# Patient Record
Sex: MALE | Race: WHITE | Employment: OTHER | RURAL
[De-identification: names, ages, dates, MRNs, and addresses within clinical notes are randomized per-mention and may not be internally consistent; named-entity substitution may affect disease eponyms.]

---

## 2017-04-21 ENCOUNTER — OFFICE VISIT (OUTPATIENT)
Dept: CARDIOLOGY CLINIC | Age: 82
End: 2017-04-21

## 2017-04-21 VITALS
OXYGEN SATURATION: 98 % | WEIGHT: 221 LBS | SYSTOLIC BLOOD PRESSURE: 120 MMHG | HEART RATE: 80 BPM | BODY MASS INDEX: 27.48 KG/M2 | HEIGHT: 75 IN | DIASTOLIC BLOOD PRESSURE: 80 MMHG | RESPIRATION RATE: 18 BRPM

## 2017-04-21 DIAGNOSIS — Z95.0 CARDIAC PACEMAKER IN SITU: ICD-10-CM

## 2017-04-21 DIAGNOSIS — J98.4 AMIODARONE PULMONARY TOXICITY: ICD-10-CM

## 2017-04-21 DIAGNOSIS — T46.2X5A AMIODARONE PULMONARY TOXICITY: ICD-10-CM

## 2017-04-21 DIAGNOSIS — I50.32 CHRONIC DIASTOLIC CONGESTIVE HEART FAILURE (HCC): ICD-10-CM

## 2017-04-21 DIAGNOSIS — I11.9 HYPERTENSIVE HEART DISEASE WITHOUT HEART FAILURE: ICD-10-CM

## 2017-04-21 DIAGNOSIS — I35.0 AORTIC VALVE STENOSIS, UNSPECIFIED ETIOLOGY: ICD-10-CM

## 2017-04-21 DIAGNOSIS — I48.20 CHRONIC ATRIAL FIBRILLATION (HCC): ICD-10-CM

## 2017-04-21 DIAGNOSIS — J84.9 ILD (INTERSTITIAL LUNG DISEASE) (HCC): ICD-10-CM

## 2017-04-21 DIAGNOSIS — I49.5 SSS (SICK SINUS SYNDROME) (HCC): Primary | ICD-10-CM

## 2017-04-21 NOTE — PROGRESS NOTES
Verified patient with two patient identifiers. Medications reviewed/approved by Dr. Rachel Vargas. Verbal from Dr. Rachel Vargas to remove the medications that were deleted during the visit.

## 2017-04-21 NOTE — MR AVS SNAPSHOT
Visit Information Date & Time Provider Department Dept. Phone Encounter #  
 4/21/2017  9:20 AM Valentina Hidalgo, [address] [facility] [phone] Your Appointments 8/24/2017 10:30 AM  
PACEMAKER with PACEMAKER, [facility]) Appt Note: Janine pt - new to Dr. Beverly Cueto (needs to see Dr. Shweta Solomon) MDT SC PM, ?carelink [address] [place] [address]  
[phone] [address] [place] [address]  
  
    
 8/24/2017 10:45 AM  
ESTABLISHED PATIENT with Krystle Fine MD  
[facility]) Appt Note: Janine pt - new to Dr. Beverly Cueto (needs to see Dr. Shweta Solomon) MDT PM device, ?carelink; Janine pt - new to Dr. Beverly Cueto (needs to see Dr. Shweta Solomon) MDT SC PM, ?carelink [address]  
[phone] [address] [place] [address]  
  
    
 4/20/2018 10:00 AM  
ESTABLISHED PATIENT with Valentina Hidalgo MD  
[address] [place] [facility]) Appt Note: annual f/u $0cp [address] [address] [phone]  
  
   
 [address] [place] [address] Upcoming Health Maintenance Date Due DTaP/Tdap/Td series (1 - Tdap) 2/26/1955 ZOSTER VACCINE AGE 60> 2/26/1994 GLAUCOMA SCREENING Q2Y 2/26/1999 Pneumococcal 65+ Low/Medium Risk (1 of 2 - PCV13) 2/26/1999 MEDICARE YEARLY EXAM 2/26/1999 INFLUENZA AGE 9 TO ADULT 8/1/2016 Allergies as of 4/21/2017  Review Complete On: 4/21/2017 By: Valentina Hidalgo MD  
  
 Severity Noted Reaction Type Reactions Amiodarone High 08/26/2016   Side Effect Shortness of Breath Pulmonary fibrosis Current Immunizations  Never Reviewed No immunizations on file. Not reviewed this visit You Were Diagnosed With   
  
 Codes Comments SSS (sick sinus syndrome) (HCC)    -  Primary ICD-10-CM: I49.5 ICD-9-CM: 427.81 Chronic atrial fibrillation (HCC)     ICD-10-CM: G84.9 ICD-9-CM: 427.31 Hypertensive heart disease without heart failure     ICD-10-CM: I11.9 ICD-9-CM: 402.90 Chronic diastolic congestive heart failure (HCC)     ICD-10-CM: I50.32 
ICD-9-CM: 428.32, 428.0 Aortic valve stenosis, unspecified etiology     ICD-10-CM: I35.0 ICD-9-CM: 424.1 Cardiac pacemaker in situ     ICD-10-CM: Z95.0 ICD-9-CM: V45.01   
 ILD [facility]interstitial lung disease) ([facility]     ICD-10-CM: J84.9 ICD-9-CM: 170 Amiodarone pulmonary toxicity (HCC)     ICD-10-CM: J98.4, T46.2X5A 
ICD-9-CM: 508.8, E980.4 Vitals BP Pulse Resp Height(growth percentile) Weight(growth percentile) SpO2  
 120/80 (BP 1 Location: Left arm, BP Patient Position: Sitting) 80 18 6' 3\" (1.905 m) 221 lb (100.2 kg) 98% BMI Smoking Status 27.62 kg/m2 Former Smoker Vitals History BMI and BSA Data Body Mass Index Body Surface Area  
 27.62 kg/m 2 2.3 m 2 Preferred Pharmacy Pharmacy Name Phone [address] [phone] Your Updated Medication List  
  
   
This list is accurate as of: 4/21/17  9:47 AM.  Always use your most recent med list.  
  
  
  
  
 apixaban 5 mg tablet Commonly known as:  Royer Mooney Take 5 mg by mouth two (2) times a day. finasteride 5 mg tablet Commonly known as:  PROSCAR  
TAKE 1 TABLET EVERY DAY  
  
 FUROSEMIDE PO Take 5 mg by mouth daily. levothyroxine 50 mcg tablet Commonly known as:  SYNTHROID  
  
 lisinopril 20 mg tablet Commonly known as:  PRINIVIL, ZESTRIL  
TAKE 1 TABLET TWICE DAILY (HAVE 3 MONTHS TO BE SEEN OR NO REFILLS)  
  
 loratadine 10 mg tablet Commonly known as:  Louetta Lin Take 1 Tab by mouth daily. metoprolol tartrate 50 mg tablet Commonly known as:  LOPRESSOR  
 TAKE 1 TABLET BY MOUTH EVERY 12 HOURS * predniSONE 5 mg tablet Commonly known as:  Milus Mile Take 1 Tab by mouth daily. Indications: pulmonary fibrosis * predniSONE 1 mg tablet Commonly known as:  Milus Mile Take 1 Tab by mouth daily (with breakfast). Indications: pulmonary fibrosis  
  
 traZODone 50 mg tablet Commonly known as:  DESYREL  
as needed. VITAMIN D3 2,000 unit Cap capsule Generic drug:  Cholecalciferol (Vitamin D3) Take  by mouth daily. * Notice: This list has 2 medication(s) that are the same as other medications prescribed for you. Read the directions carefully, and ask your doctor or other care provider to review them with you. We Performed the Following AMB POC EKG ROUTINE W/ 12 LEADS, INTER & REP [71937 CPT(R)] Introducing [facility] SERVICES! [place] [employer] introduces [employer] patient portal. Now you can access parts of your medical record, email your doctor's office, and request medication refills online. 1. In your internet browser, go to [email or web address] 2. Click on the First Time User? Click Here link in the Sign In box. You will see the New Member Sign Up page. 3. Enter your Duriana Access Code exactly as it appears below. You will not need to use this code after youve completed the sign-up process. If you do not sign up before the expiration date, you must request a new code. · Duriana Access Code: [email or web address] Expires: 7/20/2017  9:00 AM 
 
4. Enter the last four digits of your Social Security Number (xxxx) and Date of Birth (mm/dd/yyyy) as indicated and click Submit. You will be taken to the next sign-up page. 5. Create a Duriana ID. This will be your Duriana login ID and cannot be changed, so think of one that is secure and easy to remember. 6. Create a Duriana password. You can change your password at any time. 7. Enter your Password Reset Question and Answer.  This can be used at a later time if you forget your password. 8. Enter your e-mail address. You will receive e-mail notification when new information is available in [address] 9. Click Sign Up. You can now view and download portions of your medical record. 10. Click the Download Summary menu link to download a portable copy of your medical information. If you have questions, please visit the Frequently Asked Questions section of the E-Box - [email or web address] website. Remember, E-Box - [email or web address] is NOT to be used for urgent needs. For medical emergencies, dial 911. Now available from your iPhone and Android! Please provide this summary of care documentation to your next provider. Your primary care clinician is listed as Nehal Flowers. If you have any questions after today's visit, please call [phone].

## 2017-04-21 NOTE — PROGRESS NOTES
Rayray Melo is a 80 y.o. male is here for routine f/u. Here for routine f/u and PPM check. Stable SMITH which he attributes to amiodarone pulmonary toxicity/interstitial fibrosis, some fatigue. The patient denies chest pain/ shortness of breath, orthopnea, PND, LE edema, palpitations, syncope, presyncope or fatigue. 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    Cushingoid side effect of steroids ([address] 05/02/2014    Aortic valve stenosis     ILD (interstitial lung disease) ([address]     Orthostatic hypotension 04/24/2013    SSS (sick sinus syndrome) ([address]     HCVD (hypertensive cardiovascular disease)     Diastolic CHF ([address]     CRI (chronic renal insufficiency)       Urbano Frances NP  Past Medical History:   Diagnosis Date    Aortic valve stenosis     Atrial fibrillation (HCC)     BPH (benign prostatic hyperplasia)     CRI (chronic renal insufficiency)     Diastolic CHF ([address]     ED (erectile dysfunction)     HCVD (hypertensive cardiovascular disease)     ILD (interstitial lung disease) ([address]     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  Orthostatic hypotension 4/24/2013    SSS (sick sinus syndrome) (HCC)       Past Surgical History:   Procedure Laterality Date    HX PACEMAKER  2001    DDD, [employer]     Allergies   Allergen Reactions    Amiodarone Shortness of Breath     Pulmonary fibrosis      Family History   Problem Relation Age of Onset    Alcohol abuse Mother     Alcohol abuse Father       Social History     Social History    Marital status:      Spouse name: N/A    Number of children: N/A    Years of education: N/A     Occupational History    Not on file.      Social History Main Topics    Smoking status: Former Smoker     Types: Pipe     Quit date: 4/24/1983    Smokeless tobacco: Not on file    Alcohol use Yes    Drug use: Not on file    Sexual activity: Not on file     Other Topics Concern    Not on file     Social History Narrative      Current Outpatient Prescriptions   Medication Sig    predniSONE (DELTASONE) 5 mg tablet Take 1 Tab by mouth daily. Indications: pulmonary fibrosis    predniSONE (DELTASONE) 1 mg tablet Take 1 Tab by mouth daily (with breakfast). Indications: pulmonary fibrosis    metoprolol tartrate (LOPRESSOR) 50 mg tablet TAKE 1 TABLET BY MOUTH EVERY 12 HOURS    lisinopril (PRINIVIL, ZESTRIL) 20 mg tablet TAKE 1 TABLET TWICE DAILY (HAVE 3 MONTHS TO BE SEEN OR NO REFILLS)    finasteride (PROSCAR) 5 mg tablet TAKE 1 TABLET EVERY DAY    loratadine (CLARITIN) 10 mg tablet Take 1 Tab by mouth daily.  levothyroxine (SYNTHROID) 50 mcg tablet     traZODone (DESYREL) 50 mg tablet as needed.  Cholecalciferol, Vitamin D3, (VITAMIN D3) 2,000 unit cap capsule Take  by mouth daily.  FUROSEMIDE PO Take 5 mg by mouth daily.  apixaban (ELIQUIS) 5 mg tablet Take 5 mg by mouth two (2) times a day. No current facility-administered medications for this visit. Review of Symptoms:    CONST  No weight change. No fever, chills, sweats    ENT No visual changes, URI sx, sore throat    CV  See HPI   RESP  No cough, or sputum, wheezing. Also see HPI   GI  No abdominal pain or change in bowel habits. No heartburn or dysphagia. No melena or rectal bleeding.   No dysuria, urgency, frequency, hematuria   MSKEL  No joint pain, swelling. No muscle pain. SKIN  No rash or lesions. NEURO  No headache, syncope, or seizure. No weakness, loss of sensation, or paresthesias. PSYCH  No low mood or depression  No anxiety. HE/LYMPH  No easy bruising, abnormal bleeding, or enlarged glands.         Physical ExamPhysical Exam:    Visit Vitals    /80 (BP 1 Location: Left arm, BP Patient Position: Sitting)    Pulse 80    Resp 18    Ht 6' 3\" (1.905 m)    Wt 221 lb (100.2 kg)    SpO2 98%    BMI 27.62 kg/m2     Gen: NAD  HEENT: PERRL, throat clear  Neck: no mass or thyromegaly, no JVD   Heart:  Regular,Nl S1S2,  no murmur, gallop or rub.   Lungs:  clear  Abdomen:   Soft, non-tender, bowel sounds are active.   Extremities:  No edema  Pulse: symmetric  Neuro: A&O times 3, WNL      Cardiographics    ECG: V paced      Labs:   Lab Results   Component Value Date/Time    Sodium 145 08/10/2015 12:56 PM    Sodium 144 06/04/2015 02:18 PM    Sodium 144 05/02/2014 11:40 AM    Potassium 4.7 08/10/2015 12:56 PM    Potassium 4.5 06/04/2015 02:18 PM    Potassium 4.7 05/02/2014 11:40 AM    Chloride 101 08/10/2015 12:56 PM    Chloride 102 06/04/2015 02:18 PM    Chloride 105 05/02/2014 11:40 AM    CO2 28 08/10/2015 12:56 PM    CO2 29 06/04/2015 02:18 PM    CO2 29 05/02/2014 11:40 AM    Glucose 94 08/10/2015 12:56 PM    Glucose 106 06/04/2015 02:18 PM    Glucose 98 05/02/2014 11:40 AM    BUN 25 08/10/2015 12:56 PM    BUN 21 06/04/2015 02:18 PM    BUN 24 05/02/2014 11:40 AM    Creatinine 1.26 08/10/2015 12:56 PM    Creatinine 1.24 06/04/2015 02:18 PM    Creatinine 1.36 05/02/2014 11:40 AM    BUN/Creatinine ratio 20 08/10/2015 12:56 PM    BUN/Creatinine ratio 17 06/04/2015 02:18 PM    BUN/Creatinine ratio 18 05/02/2014 11:40 AM    GFR est AA 61 08/10/2015 12:56 PM    GFR est AA 63 06/04/2015 02:18 PM    GFR est AA 56 05/02/2014 11:40 AM    GFR est non-AA 53 08/10/2015 12:56 PM    GFR est non-AA 54 06/04/2015 02:18 PM    GFR est non-AA 49 05/02/2014 11:40 AM    Calcium 9.2 08/10/2015 12:56 PM    Calcium 9.3 06/04/2015 02:18 PM    Calcium 8.7 05/02/2014 11:40 AM    Bilirubin, total 0.6 06/04/2015 02:18 PM    Bilirubin, total 0.4 05/02/2014 11:40 AM    AST (SGOT) 19 06/04/2015 02:18 PM    AST (SGOT) 13 05/02/2014 11:40 AM    Alk. phosphatase 54 06/04/2015 02:18 PM    Alk.  phosphatase 50 05/02/2014 11:40 AM    Protein, total 6.3 06/04/2015 02:18 PM    Protein, total 6.4 05/02/2014 11:40 AM    Albumin 3.6 06/04/2015 02:18 PM    Albumin 3.7 05/02/2014 11:40 AM    A-G Ratio 1.3 06/04/2015 02:18 PM    A-G Ratio 1.4 05/02/2014 11:40 AM    ALT (SGPT) 14 06/04/2015 02:18 PM    ALT (SGPT) 16 05/02/2014 11:40 AM     No results found for: CPK, CPKX, CPX  No results found for: CHOL, CHOLX, CHLST, CHOLV, 104833, HDL, LDL, DLDL, LDLC, DLDLP, TGL, TGLX, TRIGL, [id number], TRIGP, CHHD, CHHDX  No results found for this or any previous visit. Assessment:         Patient Active Problem List    Diagnosis Date Noted    Chronic atrial fibrillation ([facility] 05/22/2015    Amiodarone pulmonary toxicity ([facility] 05/02/2014    Cushingoid side effect of steroids ([facility] 05/02/2014    Aortic valve stenosis     ILD (interstitial lung disease) ([facility]     Orthostatic hypotension 04/24/2013    SSS (sick sinus syndrome) ([facility]     HCVD (hypertensive cardiovascular disease)     Diastolic CHF ([address]     CRI (chronic renal insufficiency)       Here for routine f/u and PPM check. Stable SMITH which he attributes to amiodarone pulmonary toxicity/interstitial fibrosis, some fatigue. Plan:     Doing well with no adverse cardiac symptoms. Lipids and labs followed by PCP. Will see Dr Kerry Ogden in August to establish EP/PPM f/u. Continue current care and f/u in 12 months.     Sharon Yanez MD

## 2017-04-27 RX ORDER — LISINOPRIL 20 MG/1
TABLET ORAL
Qty: 180 TAB | Refills: 3 | Status: SHIPPED | OUTPATIENT
Start: 2017-04-27 | End: 2018-07-02 | Stop reason: SDUPTHER

## 2017-04-27 RX ORDER — FINASTERIDE 5 MG/1
TABLET, FILM COATED ORAL
Qty: 90 TAB | Refills: 3 | Status: SHIPPED | OUTPATIENT
Start: 2017-04-27 | End: 2018-08-16 | Stop reason: SDUPTHER

## 2017-05-24 ENCOUNTER — OFFICE VISIT (OUTPATIENT)
Dept: FAMILY MEDICINE CLINIC | Age: 82
End: 2017-05-24

## 2017-05-24 VITALS
DIASTOLIC BLOOD PRESSURE: 59 MMHG | OXYGEN SATURATION: 97 % | HEART RATE: 77 BPM | BODY MASS INDEX: 27.35 KG/M2 | HEIGHT: 75 IN | TEMPERATURE: 97 F | SYSTOLIC BLOOD PRESSURE: 126 MMHG | WEIGHT: 220 LBS | RESPIRATION RATE: 18 BRPM

## 2017-05-24 DIAGNOSIS — R06.02 SHORTNESS OF BREATH: ICD-10-CM

## 2017-05-24 DIAGNOSIS — R05.9 COUGH: ICD-10-CM

## 2017-05-24 DIAGNOSIS — Z13.31 SCREENING FOR DEPRESSION: ICD-10-CM

## 2017-05-24 DIAGNOSIS — T46.2X5A AMIODARONE PULMONARY TOXICITY: ICD-10-CM

## 2017-05-24 DIAGNOSIS — Z13.39 SCREENING FOR ALCOHOLISM: ICD-10-CM

## 2017-05-24 DIAGNOSIS — Z20.818 EXPOSURE TO STREP THROAT: ICD-10-CM

## 2017-05-24 DIAGNOSIS — Z00.00 ROUTINE GENERAL MEDICAL EXAMINATION AT A HEALTH CARE FACILITY: Primary | ICD-10-CM

## 2017-05-24 DIAGNOSIS — J98.4 AMIODARONE PULMONARY TOXICITY: ICD-10-CM

## 2017-05-24 DIAGNOSIS — J02.9 SORE THROAT: ICD-10-CM

## 2017-05-24 DIAGNOSIS — Z23 ENCOUNTER FOR IMMUNIZATION: ICD-10-CM

## 2017-05-24 DIAGNOSIS — Z79.01 CHRONIC ANTICOAGULATION: ICD-10-CM

## 2017-05-24 DIAGNOSIS — I48.20 CHRONIC ATRIAL FIBRILLATION (HCC): ICD-10-CM

## 2017-05-24 LAB
S PYO AG THROAT QL: NEGATIVE
VALID INTERNAL CONTROL?: YES

## 2017-05-24 RX ORDER — AMOXICILLIN AND CLAVULANATE POTASSIUM 500; 125 MG/1; MG/1
1 TABLET, FILM COATED ORAL 2 TIMES DAILY
Qty: 20 TAB | Refills: 0 | Status: SHIPPED | OUTPATIENT
Start: 2017-05-24 | End: 2017-06-02

## 2017-05-24 NOTE — MR AVS SNAPSHOT
Visit Information Date & Time Provider Department Dept. Phone Encounter #  
 5/24/2017  1:00 PM Drew Anthony MD [address] [place] [address] [id number] Follow-up Instructions Return in about 1 year (around 5/24/2018), or if symptoms worsen or fail to improve. Your Appointments 8/24/2017 10:30 AM  
PACEMAKER with PACEMAKER, [facility] [address] [facility]) Appt Note: Janine pt - new to Dr. Humberto Scott (needs to see Dr. Katty Santos) MDT SC PM, ?carelink [address]  
[phone] [address] [place] [address]  
  
    
 8/24/2017 10:45 AM  
ESTABLISHED PATIENT with Rebekah Norris MD  
[facility] [address] [place] [address]) Appt Note: Janine pt - new to Dr. Humberto Scott (needs to see Dr. Katty Santos) MDT PM device, ?carelink; Janine pt - new to Dr. Humberto Scott (needs to see Dr. Katty Santos) MDT SC PM, ?carelink [address]  
[phone] [address] [place] [address]  
  
    
 4/20/2018 10:00 AM  
ESTABLISHED PATIENT with Sharlene Myers MD  
[address] [facility] [address] [facility]) Appt Note: annual f/u $0cp [address] [address] [phone]  
  
   
 [address] [place] [address] Upcoming Health Maintenance Date Due  
 GLAUCOMA SCREENING Q2Y 2/26/1999 Pneumococcal 65+ Low/Medium Risk (1 of 2 - PCV13) 2/26/1999 MEDICARE YEARLY EXAM 2/26/1999 INFLUENZA AGE 9 TO ADULT 8/1/2017 DTaP/Tdap/Td series (2 - Td) 5/24/2027 Allergies as of 5/24/2017  Review Complete On: 5/24/2017 By: Drew Anthony MD  
  
 Severity Noted Reaction Type Reactions Amiodarone High 08/26/2016   Side Effect Shortness of Breath Pulmonary fibrosis Current Immunizations  Reviewed on 5/24/2017 Name Date Pneumococcal Conjugate (PCV-13) 9/25/2015 Pneumococcal Polysaccharide (PPSV-23) 5/24/2017 Zoster Vaccine, Live 4/14/2014 Reviewed by Chase Nova LPN on 4/34/7896 at  1:13 PM  
 Reviewed by Chase Nova LPN on 1/20/7453 at  1:38 PM  
You Were Diagnosed With   
  
 Codes Comments Routine general medical examination at a health care facility    -  Primary ICD-10-CM: Z00.00 ICD-9-CM: V70.0 Sore throat     ICD-10-CM: J02.9 ICD-9-CM: 997 Amiodarone pulmonary toxicity (HCC)     ICD-10-CM: J98.4, T46.2X5A 
ICD-9-CM: 508.8, E980.4 Cough     ICD-10-CM: R05 ICD-9-CM: 786.2 Shortness of breath     ICD-10-CM: R06.02 
ICD-9-CM: 786.05 Exposure to strep throat     ICD-10-CM: Z20.818 ICD-9-CM: V01.89 Screening for alcoholism     ICD-10-CM: Z13.89 ICD-9-CM: V79.1 Screening for depression     ICD-10-CM: Z13.89 ICD-9-CM: V79.0 Encounter for immunization     ICD-10-CM: K87 ICD-9-CM: V03.89 Chronic atrial fibrillation (HCC)     ICD-10-CM: J21.6 ICD-9-CM: 427.31 Chronic anticoagulation     ICD-10-CM: Z79.01 
ICD-9-CM: V58.61 Vitals BP Pulse Temp Resp Height(growth percentile) Weight(growth percentile) 126/59 77 97 °F (36.1 °C) (Oral) 18 6' 3\" (1.905 m) 220 lb (99.8 kg) SpO2 BMI Smoking Status 97% 27.5 kg/m2 Former Smoker BMI and BSA Data Body Mass Index Body Surface Area  
 27.5 kg/m 2 2.3 m 2 Preferred Pharmacy Pharmacy Name Phone [address], [address] AT [facility]  [facility] & [facility] Yulissa Acostazia [phone] Your Updated Medication List  
  
   
This list is accurate as of: 5/24/17  2:00 PM.  Always use your most recent med list.  
  
  
  
  
 amoxicillin-clavulanate 500-125 mg per tablet Commonly known as:  AUGMENTIN Take 1 Tab by mouth two (2) times a day for 10 days. Indications: lung infection  
  
 apixaban 5 mg tablet Commonly known as:  Linda Lint Take 5 mg by mouth two (2) times a day. finasteride 5 mg tablet Commonly known as:  PROSCAR  
TAKE 1 TABLET EVERY DAY  
  
 FUROSEMIDE PO Take 5 mg by mouth daily. levothyroxine 50 mcg tablet Commonly known as:  SYNTHROID  
  
 lisinopril 20 mg tablet Commonly known as:  PRINIVIL, ZESTRIL  
TAKE 1 TABLET TWICE DAILY (HAVE 3 MONTHS TO BE SEEN OR NO REFILLS)  
  
 loratadine 10 mg tablet Commonly known as:  Doris Clarke Take 1 Tab by mouth daily. metoprolol tartrate 50 mg tablet Commonly known as:  LOPRESSOR  
TAKE 1 TABLET BY MOUTH EVERY 12 HOURS * predniSONE 5 mg tablet Commonly known as:  Kerline Shiley Take 1 Tab by mouth daily. Indications: pulmonary fibrosis * predniSONE 1 mg tablet Commonly known as:  Kerline Shiley Take 1 Tab by mouth daily (with breakfast). Indications: pulmonary fibrosis  
  
 traZODone 50 mg tablet Commonly known as:  DESYREL  
as needed. VITAMIN D3 2,000 unit Cap capsule Generic drug:  Cholecalciferol (Vitamin D3) Take  by mouth daily. * Notice: This list has 2 medication(s) that are the same as other medications prescribed for you. Read the directions carefully, and ask your doctor or other care provider to review them with you. Prescriptions Sent to Pharmacy Refills  
 amoxicillin-clavulanate (AUGMENTIN) 500-125 mg per tablet 0 Sig: Take 1 Tab by mouth two (2) times a day for 10 days. Indications: lung infection Class: Normal  
 Pharmacy: [facility] [address] [address], [address] [place] [address] [address] Ph #: [phone] Route: Oral  
  
We Performed the Following ADMIN PNEUMOCOCCAL VACCINE [ HCPCS] AMB POC RAPID STREP A [69683 CPT(R)] [place] [address] [[id number] HCPCS] PNEUMOCOCCAL POLYSACCHARIDE VACCINE, 23-VALENT, ADULT OR IMMUNOSUPPRESSED PT DOSE, [02474 CPT(R)] Follow-up Instructions Return in about 1 year (around 5/24/2018), or if symptoms worsen or fail to improve. Introducing [facility]! Jo[employer] Rodgers introduces [employer] patient portal. Now you can access parts of your medical record, email your doctor's office, and request medication refills online. 1. In your internet browser, go to [email or web address] 2. Click on the First Time User? Click Here link in the Sign In box. You will see the New Member Sign Up page. 3. Enter your Cocodot Access Code exactly as it appears below. You will not need to use this code after youve completed the sign-up process. If you do not sign up before the expiration date, you must request a new code. · Cocodot Access Code: [email or web address] Expires: 7/20/2017  9:00 AM 
 
4. Enter the last four digits of your Social Security Number (xxxx) and Date of Birth (mm/dd/yyyy) as indicated and click Submit. You will be taken to the next sign-up page. 5. Create a Cocodot ID. This will be your Cocodot login ID and cannot be changed, so think of one that is secure and easy to remember. 6. Create a Cocodot password. You can change your password at any time. 7. Enter your Password Reset Question and Answer. This can be used at a later time if you forget your password. 8. Enter your e-mail address. You will receive e-mail notification when new information is available in [address] 9. Click Sign Up. You can now view and download portions of your medical record. 10. Click the Download Summary menu link to download a portable copy of your medical information. If you have questions, please visit the Frequently Asked Questions section of the Cocodot website. Remember, [employer] is NOT to be used for urgent needs. For medical emergencies, dial 911. Now available from your iPhone and Android! Please provide this summary of care documentation to your next provider. Your primary care clinician is listed as Myla Oleary. If you have any questions after today's visit, please call [phone].

## 2017-05-24 NOTE — PROGRESS NOTES
Maryann Hough is a 80 y.o. male presenting for/with: Annual Wellness Visit; Advance Care Planning; and Hoarse    HPI:  Pulmonary fibrosis/Amio toxicity  Current control: Fair   Current level: mild intermittent  Current symptoms: cough decreased exercise tolerance dyspnea  Current controller: prednisone 6mg qd  Last flareup: 8/16. Number of flareups in past year:1, did well with augmentin. Current symptom relief med: none    Atrial fibrillation. Current symptoms: none  Current control agent: B-blocker  Current anticoagulant: eliquis  History of bleeding problems: none    PMH, SH, Medications/Allergies: reviewed, on chart. ROS:  Constitutional: No fever, chills or weight loss  Respiratory: POS cough SOB as above  CV: No chest pain or Palpitations    Visit Vitals    /59    Pulse 77    Temp 97 °F (36.1 °C) (Oral)    Resp 18    Ht 6' 3\" (1.905 m)    Wt 220 lb (99.8 kg)    SpO2 97%    BMI 27.5 kg/m2     Wt Readings from Last 3 Encounters:   05/24/17 220 lb (99.8 kg)   04/21/17 221 lb (100.2 kg)   08/26/16 217 lb (98.4 kg)     Physical Examination: General appearance - alert, well appearing, and in no distress  Mental status - alert, oriented to person, place, and time  Eyes - pupils equal and reactive, extraocular eye movements intact  ENT - bilateral external ears and nose normal. Normal lips  Neck - supple, no significant adenopathy, no thyromegaly or mass  Lymphatics - no palpable lymphadenopathy, no hepatosplenomegaly  Chest - bibasilar mild crackles on auscultation, no wheezes, rhonchi. Symmetric air entry. Heart - normal rate, irreg/irregular rhythm, normal S1, S2, no murmurs, rubs, clicks or gallops  Extremities - peripheral pulses normal, no pedal edema, no clubbing or cyanosis    A/p:  PF with another respiratory flareup. On chronic immunosuppression with prednisone. Need tx with abx. Tx with augmentin (will also take care of strep exposure).  Tx cough with robitussin w/codeine PRN.    Afib/ac  Doing well. Cont' current tx. HTN  well controlled. con't current tx. Avoid decongestants. Had good labs in [place] at PCP there back in March 2017 per pt. F/U with cardio PRN    F/U PRN, gets regular primary care in [place].    ______________________________________________________________________    Leigha Vilchis is a 80 y.o. male and presents for annual Medicare Wellness Visit. Problem List: Reviewed with patient and discussed risk factors. 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 Male Alcohol Screening: On any occasion during the past 3 months, have you had more than 4 drinks containing alcohol? No    Do you average more than 14 drinks per week? No    ROS:  Constitutional: No fever, chills or weight loss  Respiratory: No cough, SOB   CV: No chest pain or Palpitations    Objective:  Visit Vitals    /59    Pulse 77    Temp 97 °F (36.1 °C) (Oral)    Resp 18    Ht 6' 3\" (1.905 m)    Wt 220 lb (99.8 kg)    SpO2 97%    BMI 27.5 kg/m2    Body mass index is 27.5 kg/(m^2).     Assessment of cognitive impairment: Alert and oriented x 3    Depression Screen:   PHQ over the last two weeks 5/24/2017   PHQ Not Done -   Little interest or pleasure in doing things Not at all   Feeling down, depressed or hopeless Not at all   Total Score PHQ 2 0       Fall Risk Assessment:    Fall Risk Assessment, last 12 mths 5/24/2017   Able to walk? Yes   Fall in past 12 months? No       Functional Ability:   Does the patient exhibit a steady gait? yes   How long did it take the patient to get up and walk from a sitting position? 1s   Is the patient self reliant?  (ie can do own laundry, meals, household chores)  yes     Does the patient handle his/her own medications? yes     Does the patient handle his/her own money? yes     Is the patients home safe (ie good lighting, handrails on stairs and bath, etc.)? yes     Did you notice or did patient express any hearing difficulties? Yes, but not bothersome   Did you notice or did patient express any vision difficulties? no       Advance Care Planning:   Patient was offered the opportunity to discuss advance care planning:  yes     Does patient have an Advance Directive:  yes   If no, did you provide information on Caring Connections? NA     Plan:     Orders Placed This Encounter    AMB POC RAPID STREP A       Health Maintenance   Topic Date Due    GLAUCOMA SCREENING Q2Y  02/26/1999    Pneumococcal 65+ Low/Medium Risk (1 of 2 - PCV13) 02/26/1999    MEDICARE YEARLY EXAM  02/26/1999    INFLUENZA AGE 9 TO ADULT  08/01/2017    DTaP/Tdap/Td series (2 - Td) 05/24/2027    ZOSTER VACCINE AGE 60>  Completed       *Patient verbalized understanding and agreement with the plan. A copy of the After Visit Summary with personalized health plan was given to the patient today.

## 2017-06-02 ENCOUNTER — OFFICE VISIT (OUTPATIENT)
Dept: FAMILY MEDICINE CLINIC | Age: 82
End: 2017-06-02

## 2017-06-02 VITALS
BODY MASS INDEX: 27.35 KG/M2 | HEART RATE: 53 BPM | RESPIRATION RATE: 18 BRPM | DIASTOLIC BLOOD PRESSURE: 64 MMHG | OXYGEN SATURATION: 98 % | TEMPERATURE: 97.6 F | WEIGHT: 220 LBS | SYSTOLIC BLOOD PRESSURE: 139 MMHG | HEIGHT: 75 IN

## 2017-06-02 DIAGNOSIS — T46.2X5A AMIODARONE PULMONARY TOXICITY: ICD-10-CM

## 2017-06-02 DIAGNOSIS — J84.9 INTERSTITIAL LUNG DISEASE (HCC): Primary | ICD-10-CM

## 2017-06-02 DIAGNOSIS — J98.4 AMIODARONE PULMONARY TOXICITY: ICD-10-CM

## 2017-06-02 DIAGNOSIS — I11.9 HYPERTENSIVE HEART DISEASE WITHOUT HEART FAILURE: ICD-10-CM

## 2017-06-02 DIAGNOSIS — J20.9 ACUTE BRONCHITIS, UNSPECIFIED ORGANISM: ICD-10-CM

## 2017-06-02 DIAGNOSIS — I48.20 CHRONIC ATRIAL FIBRILLATION (HCC): ICD-10-CM

## 2017-06-02 DIAGNOSIS — J40 BRONCHITIS: ICD-10-CM

## 2017-06-02 RX ORDER — AZITHROMYCIN 250 MG/1
TABLET, FILM COATED ORAL
Qty: 6 TAB | Refills: 0 | Status: SHIPPED | OUTPATIENT
Start: 2017-06-02 | End: 2017-06-07

## 2017-06-02 RX ORDER — PREDNISONE 10 MG/1
TABLET ORAL
Qty: 20 TAB | Refills: 0 | Status: SHIPPED | OUTPATIENT
Start: 2017-06-02 | End: 2017-09-12 | Stop reason: DRUGHIGH

## 2017-06-02 NOTE — MR AVS SNAPSHOT
Visit Information Date & Time Provider Department Dept. Phone Encounter #  
 6/2/2017  2:00 PM Natasha Ramirez  [facility] [phone] [id number] Your Appointments 8/24/2017 10:30 AM  
PACEMAKER with PACEMAKER, [facility]) Appt Note: Janine pt - new to Dr. Gee Jurado (needs to see Dr. Val Short) MDT SC PM, ?carelink [address] [place] [address]  
[phone] [address] [place] [address]  
  
    
 8/24/2017 10:45 AM  
ESTABLISHED PATIENT with Shasta Bright MD  
[facility]) Appt Note: Janine pt - new to Dr. Gee Jurado (needs to see Dr. Val Short) MDT PM device, ?carelink; Janine pt - new to Dr. Gee Jurado (needs to see Dr. Val Short) MDT SC PM, ?carelink [address] [place] [address]  
[phone] [address] [place] [address]  
  
    
 4/20/2018 10:00 AM  
ESTABLISHED PATIENT with Eder Monsivais MD  
[address] [place] [facility]) Appt Note: annual f/u $0cp [address] [facility] [address] [address] [phone]  
  
   
 [address] [place] [id number] Upcoming Health Maintenance Date Due  
 GLAUCOMA SCREENING Q2Y 2/26/1999 INFLUENZA AGE 9 TO ADULT 8/1/2017 MEDICARE YEARLY EXAM 5/25/2018 DTaP/Tdap/Td series (2 - Td) 5/24/2027 Allergies as of 6/2/2017  Review Complete On: 6/2/2017 By: Natasha Ramirez MD  
  
 Severity Noted Reaction Type Reactions Amiodarone High 08/26/2016   Side Effect Shortness of Breath Pulmonary fibrosis Current Immunizations  Reviewed on 5/24/2017 Name Date Pneumococcal Conjugate (PCV-13) 9/25/2015 Pneumococcal Polysaccharide (PPSV-23) 5/24/2017 Zoster Vaccine, Live 4/14/2014 Not reviewed this visit You Were Diagnosed With   
  
 Codes Comments Interstitial lung disease ([facility]    -  Primary ICD-10-CM: J84.9 ICD-9-CM: 918 Bronchitis     ICD-10-CM: J40 ICD-9-CM: 391 Chronic atrial fibrillation (HCC)     ICD-10-CM: P01.3 ICD-9-CM: 427.31 Amiodarone pulmonary toxicity (HCC)     ICD-10-CM: J98.4, T46.2X5A 
ICD-9-CM: 508.8, E980.4 Hypertensive heart disease without heart failure     ICD-10-CM: I11.9 ICD-9-CM: 402.90 Acute bronchitis, unspecified organism     ICD-10-CM: J20.9 ICD-9-CM: 466.0 Vitals BP Pulse Temp Resp Height(growth percentile) 139/64 (BP 1 Location: Right arm, BP Patient Position: Sitting) (!) 53 97.6 °F (36.4 °C) (Temporal) 18 6' 3\" (1.905 m) Weight(growth percentile) SpO2 BMI Smoking Status 220 lb (99.8 kg) 98% 27.5 kg/m2 Former Smoker BMI and BSA Data Body Mass Index Body Surface Area  
 27.5 kg/m 2 2.3 m 2 Preferred Pharmacy Pharmacy Name Phone [facility], [address] AT [facility] [address] & [address] [facility] [phone] Your Updated Medication List  
  
   
This list is accurate as of: 6/2/17  2:47 PM.  Always use your most recent med list.  
  
  
  
  
 apixaban 5 mg tablet Commonly known as:  Bedelia Doll Take 5 mg by mouth two (2) times a day. azithromycin 250 mg tablet Commonly known as:  Leelee Letters Take 2 tablets today, then take 1 tablet daily  
  
 finasteride 5 mg tablet Commonly known as:  PROSCAR  
TAKE 1 TABLET EVERY DAY  
  
 FUROSEMIDE PO Take 5 mg by mouth daily. levothyroxine 50 mcg tablet Commonly known as:  SYNTHROID  
  
 lisinopril 20 mg tablet Commonly known as:  PRINIVIL, ZESTRIL  
TAKE 1 TABLET TWICE DAILY (HAVE 3 MONTHS TO BE SEEN OR NO REFILLS)  
  
 loratadine 10 mg tablet Commonly known as:  Lupe [place] Take 1 Tab by mouth daily. metoprolol tartrate 50 mg tablet Commonly known as:  LOPRESSOR  
TAKE 1 TABLET BY MOUTH EVERY 12 HOURS * predniSONE 5 mg tablet Commonly known as:  Job Ron Take 1 Tab by mouth daily. Indications: pulmonary fibrosis * predniSONE 1 mg tablet Commonly known as:  Job Ron Take 1 Tab by mouth daily (with breakfast). Indications: pulmonary fibrosis * predniSONE 10 mg tablet Commonly known as:  DELTASONE  
4 tablets daily for 2 days then 3 tablets daily for 2 days then 2 tablets daily for 2 days then 1 tablet daily for 2 days  
  
 traZODone 50 mg tablet Commonly known as:  DESYREL  
as needed. VITAMIN D3 2,000 unit Cap capsule Generic drug:  Cholecalciferol (Vitamin D3) Take  by mouth daily. * Notice: This list has 3 medication(s) that are the same as other medications prescribed for you. Read the directions carefully, and ask your doctor or other care provider to review them with you. Prescriptions Sent to Pharmacy Refills  
 azithromycin (ZITHROMAX) 250 mg tablet 0 Sig: Take 2 tablets today, then take 1 tablet daily Class: Normal  
 Pharmacy: [facility] [address]  Ph #: [phone]  
 predniSONE (DELTASONE) 10 mg tablet 0 Si tablets daily for 2 days then 3 tablets daily for 2 days then 2 tablets daily for 2 days then 1 tablet daily for 2 days Class: Normal  
 Pharmacy: [facility] [address]  Ph #: [phone] We Performed the Following INHAL RX, AIRWAY OBST/DX SPUTUM INDUCT [id number] CPT(R)] To-Do List   
 2017 Imaging:  XR CHEST PA LAT Patient Instructions Continue current medications Stop Augmentin Zithromax ×5 days Prednisone taper . Return to previous prednisone dose of 6 mg daily after the taper Chest x-ray Follow-up if worse or not better over the next week Introducing [facility] SERVICES!    
 Kady Guerin introduces [employer] patient portal. Now you can access parts of your medical record, email your doctor's office, and request medication refills online. 1. In your internet browser, go to [email or web address] 2. Click on the First Time User? Click Here link in the Sign In box. You will see the New Member Sign Up page. 3. Enter your [employer] Access Code exactly as it appears below. You will not need to use this code after youve completed the sign-up process. If you do not sign up before the expiration date, you must request a new code. · [employer] Access Code: [email or web address] Expires: 7/20/2017  9:00 AM 
 
4. Enter the last four digits of your Social Security Number (xxxx) and Date of Birth (mm/dd/yyyy) as indicated and click Submit. You will be taken to the next sign-up page. 5. Create a [employer] ID. This will be your [employer] login ID and cannot be changed, so think of one that is secure and easy to remember. 6. Create a [employer] password. You can change your password at any time. 7. Enter your Password Reset Question and Answer. This can be used at a later time if you forget your password. 8. Enter your e-mail address. You will receive e-mail notification when new information is available in [address] 9. Click Sign Up. You can now view and download portions of your medical record. 10. Click the Download Summary menu link to download a portable copy of your medical information. If you have questions, please visit the Frequently Asked Questions section of the [employer] website. Remember, [employer] is NOT to be used for urgent needs. For medical emergencies, dial 911. Now available from your iPhone and Android! Please provide this summary of care documentation to your next provider. Your primary care clinician is listed as Michelle Benítez. If you have any questions after today's visit, please call [phone].

## 2017-06-02 NOTE — PATIENT INSTRUCTIONS
Continue current medications  Stop Augmentin  Zithromax ×5 days  Prednisone taper .   Return to previous prednisone dose of 6 mg daily after the taper  Chest x-ray   Follow-up if worse or not better over the next week

## 2017-09-06 ENCOUNTER — TELEPHONE (OUTPATIENT)
Dept: CARDIOLOGY CLINIC | Age: 82
End: 2017-09-06

## 2017-09-06 NOTE — TELEPHONE ENCOUNTER
Pt calling requesting to speak w/nurse regarding \"missed casey't in [place]\". Would not clarify further.

## 2017-09-12 ENCOUNTER — OFFICE VISIT (OUTPATIENT)
Dept: CARDIOLOGY CLINIC | Age: 82
End: 2017-09-12

## 2017-09-12 VITALS
WEIGHT: 224 LBS | HEART RATE: 61 BPM | OXYGEN SATURATION: 98 % | SYSTOLIC BLOOD PRESSURE: 148 MMHG | DIASTOLIC BLOOD PRESSURE: 76 MMHG | HEIGHT: 75 IN | BODY MASS INDEX: 27.85 KG/M2 | RESPIRATION RATE: 16 BRPM

## 2017-09-12 DIAGNOSIS — I48.20 CHRONIC ATRIAL FIBRILLATION (HCC): Primary | ICD-10-CM

## 2017-09-12 DIAGNOSIS — I49.5 SSS (SICK SINUS SYNDROME) (HCC): ICD-10-CM

## 2017-09-12 DIAGNOSIS — J98.4 AMIODARONE PULMONARY TOXICITY: ICD-10-CM

## 2017-09-12 DIAGNOSIS — T46.2X5A AMIODARONE PULMONARY TOXICITY: ICD-10-CM

## 2017-09-12 DIAGNOSIS — I50.32 CHRONIC DIASTOLIC CONGESTIVE HEART FAILURE (HCC): ICD-10-CM

## 2017-09-12 DIAGNOSIS — I35.0 AORTIC VALVE STENOSIS, UNSPECIFIED ETIOLOGY: ICD-10-CM

## 2017-09-12 DIAGNOSIS — J84.9 ILD (INTERSTITIAL LUNG DISEASE) (HCC): ICD-10-CM

## 2017-09-12 DIAGNOSIS — I11.9 HYPERTENSIVE HEART DISEASE WITHOUT HEART FAILURE: ICD-10-CM

## 2017-09-12 DIAGNOSIS — Z95.0 CARDIAC PACEMAKER IN SITU: ICD-10-CM

## 2017-09-12 NOTE — PROGRESS NOTES
Daria Murrieta is a 80 y.o. male is here for routine f/u. Stable sx of fatigue, SMITH, essentially unchanged (amiodarone pulmonary toxicity/interstitial fibrosis) . The patient denies chest pain, orthopnea, PND, LE edema, palpitations, syncope, presyncope. 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    Cushingoid side effect of steroids ([address] 05/02/2014    Aortic valve stenosis     ILD (interstitial lung disease) ([address]     Orthostatic hypotension 04/24/2013    SSS (sick sinus syndrome) (HCC)     HCVD (hypertensive cardiovascular disease)     Diastolic CHF (HCC)     CRI (chronic renal insufficiency)       Pauline Jay NP  Past Medical History:   Diagnosis Date    Aortic valve stenosis     Atrial fibrillation (HCC)     BPH (benign prostatic hyperplasia)     CRI (chronic renal insufficiency)     Diastolic CHF ([address]     ED (erectile dysfunction)     HCVD (hypertensive cardiovascular disease)     ILD (interstitial lung disease) ([address]     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  Orthostatic hypotension 4/24/2013    SSS (sick sinus syndrome) (HCC)       Past Surgical History:   Procedure Laterality Date    HX PACEMAKER  2001    DDD, [employer]     Allergies   Allergen Reactions    Amiodarone Shortness of Breath     Pulmonary fibrosis      Family History   Problem Relation Age of Onset    Alcohol abuse Mother     Alcohol abuse Father       Social History     Social History    Marital status:      Spouse name: N/A    Number of children: N/A    Years of education: N/A     Occupational History    Not on file.      Social History Main Topics    Smoking status: Former Smoker     Types: Pipe     Quit date: 4/24/1983    Smokeless tobacco: Not on file    Alcohol use Yes    Drug use: Not on file    Sexual activity: Not on file     Other Topics Concern    Not on file     Social History Narrative      Current Outpatient Prescriptions   Medication Sig    predniSONE (DELTASONE) 10 mg tablet 4 tablets daily for 2 days then 3 tablets daily for 2 days then 2 tablets daily for 2 days then 1 tablet daily for 2 days    finasteride (PROSCAR) 5 mg tablet TAKE 1 TABLET EVERY DAY    lisinopril (PRINIVIL, ZESTRIL) 20 mg tablet TAKE 1 TABLET TWICE DAILY (HAVE 3 MONTHS TO BE SEEN OR NO REFILLS)    predniSONE (DELTASONE) 5 mg tablet Take 1 Tab by mouth daily. Indications: pulmonary fibrosis (Patient taking differently: Take 5 mg by mouth daily. Patient takes 6 mg q day. Indications: pulmonary fibrosis)    predniSONE (DELTASONE) 1 mg tablet Take 1 Tab by mouth daily (with breakfast). Indications: pulmonary fibrosis    metoprolol tartrate (LOPRESSOR) 50 mg tablet TAKE 1 TABLET BY MOUTH EVERY 12 HOURS    loratadine (CLARITIN) 10 mg tablet Take 1 Tab by mouth daily.  levothyroxine (SYNTHROID) 50 mcg tablet     traZODone (DESYREL) 50 mg tablet as needed.  Cholecalciferol, Vitamin D3, (VITAMIN D3) 2,000 unit cap capsule Take  by mouth daily.  FUROSEMIDE PO Take 5 mg by mouth daily.  apixaban (ELIQUIS) 5 mg tablet Take 5 mg by mouth two (2) times a day. No current facility-administered medications for this visit. Review of Symptoms:    CONST  No weight change. No fever, chills, sweats    ENT No visual changes, URI sx, sore throat    CV  See HPI   RESP  No cough, or sputum, wheezing. Also see HPI   GI  No abdominal pain or change in bowel habits. No heartburn or dysphagia. No melena or rectal bleeding.   No dysuria, urgency, frequency, hematuria   MSKEL  No joint pain, swelling. No muscle pain. SKIN  No rash or lesions. NEURO  No headache, syncope, or seizure. No weakness, loss of sensation, or paresthesias. PSYCH  No low mood or depression  No anxiety. HE/LYMPH  No easy bruising, abnormal bleeding, or enlarged glands. Physical ExamPhysical Exam:    There were no vitals taken for this visit. Gen: NAD  HEENT:  PERRL, throat clear  Neck: no adenopathy, no thyromegaly, no JVD   Heart:  Regular,Nl S1S2,  II/VI murmur, no gallop or rub.   Lungs:  Dry crackles  Abdomen:   Soft, non-tender, bowel sounds are active.   Extremities:  No edema  Pulse: symmetric  Neuro: A&O times 3, No focal neuro deficits    Cardiographics    ECG: V paced, underlying afib    Labs:   Lab Results   Component Value Date/Time    Sodium 145 08/10/2015 12:56 PM    Sodium 144 06/04/2015 02:18 PM    Sodium 144 05/02/2014 11:40 AM    Potassium 4.7 08/10/2015 12:56 PM    Potassium 4.5 06/04/2015 02:18 PM    Potassium 4.7 05/02/2014 11:40 AM    Chloride 101 08/10/2015 12:56 PM    Chloride 102 06/04/2015 02:18 PM    Chloride 105 05/02/2014 11:40 AM    CO2 28 08/10/2015 12:56 PM    CO2 29 06/04/2015 02:18 PM    CO2 29 05/02/2014 11:40 AM    Glucose 94 08/10/2015 12:56 PM    Glucose 106 06/04/2015 02:18 PM    Glucose 98 05/02/2014 11:40 AM    BUN 25 08/10/2015 12:56 PM    BUN 21 06/04/2015 02:18 PM    BUN 24 05/02/2014 11:40 AM    Creatinine 1.26 08/10/2015 12:56 PM    Creatinine 1.24 06/04/2015 02:18 PM    Creatinine 1.36 05/02/2014 11:40 AM    BUN/Creatinine ratio 20 08/10/2015 12:56 PM    BUN/Creatinine ratio 17 06/04/2015 02:18 PM    BUN/Creatinine ratio 18 05/02/2014 11:40 AM    GFR est AA 61 08/10/2015 12:56 PM    GFR est AA 63 06/04/2015 02:18 PM    GFR est AA 56 05/02/2014 11:40 AM    GFR est non-AA 53 08/10/2015 12:56 PM    GFR est non-AA 54 06/04/2015 02:18 PM    GFR est non-AA 49 05/02/2014 11:40 AM    Calcium 9.2 08/10/2015 12:56 PM    Calcium 9.3 06/04/2015 02:18 PM    Calcium 8.7 05/02/2014 11:40 AM    Bilirubin, total 0.6 06/04/2015 02:18 PM    Bilirubin, total 0.4 05/02/2014 11:40 AM    AST (SGOT) 19 06/04/2015 02:18 PM    AST (SGOT) 13 05/02/2014 11:40 AM    Alk. phosphatase 54 06/04/2015 02:18 PM    Alk.  phosphatase 50 05/02/2014 11:40 AM Protein, total 6.3 06/04/2015 02:18 PM    Protein, total 6.4 05/02/2014 11:40 AM    Albumin 3.6 06/04/2015 02:18 PM    Albumin 3.7 05/02/2014 11:40 AM    A-G Ratio 1.3 06/04/2015 02:18 PM    A-G Ratio 1.4 05/02/2014 11:40 AM    ALT (SGPT) 14 06/04/2015 02:18 PM    ALT (SGPT) 16 05/02/2014 11:40 AM     No results found for: CPK, CPKX, CPX  No results found for: CHOL, CHOLX, CHLST, CHOLV, 477851, HDL, LDL, LDLC, DLDLP, TGLX, TRIGL, TRIGP, CHHD, CHHDX  No results found for this or any previous visit. Assessment:         Patient Active Problem List    Diagnosis Date Noted    Chronic anticoagulation 05/24/2017    Chronic atrial fibrillation ([facility] 05/22/2015    Amiodarone pulmonary toxicity 05/02/2014    Cushingoid side effect of steroids ([facility] 05/02/2014    Aortic valve stenosis     ILD (interstitial lung disease) ([facility]     Orthostatic hypotension 04/24/2013    SSS (sick sinus syndrome) (HCC)     HCVD (hypertensive cardiovascular disease)     Diastolic CHF (HCC)     CRI (chronic renal insufficiency)       Stable sx of fatigue, SMITH, essentially unchanged (amiodarone pulmonary toxicity/interstitial fibrosis) . Plan:     Doing well with no adverse cardiac symptoms. Lipids and labs followed by PCP. Continue current care and f/u in 6 months.     Ivet Brock MD

## 2017-09-12 NOTE — MR AVS SNAPSHOT
Visit Information Date & Time Provider Department Dept. Phone Encounter #  
 9/12/2017 11:00 AM Layne Juarez, [address] [facility]iology [facility] [phone] [id number] Your Appointments 10/12/2017 10:15 AM  
ANNUAL with Janice Langford MD  
[facility] Appt Note: pt rsched 9-5 -17 -lj [id number] [facility]  
[phone] [address] [facility]  
  
    
 10/12/2017 10:30 AM  
PACEMAKER with PACEMAKER, [facility] Appt Note: pacemaker check -lj 9-5-17  
 [id number] [facility]  
[phone] [address] [facility]  
  
    
 4/20/2018 10:00 AM  
ESTABLISHED PATIENT with Layne Juarez MD  
[address] [place] [facility] Appt Note: annual f/u $0cp [address] [address] [phone]  
  
   
 [address] [place] [address] Upcoming Health Maintenance Date Due INFLUENZA AGE 9 TO ADULT 8/1/2017 GLAUCOMA SCREENING Q2Y 9/18/2017 MEDICARE YEARLY EXAM 5/25/2018 DTaP/Tdap/Td series (2 - Td) 5/24/2027 Allergies as of 9/12/2017  Review Complete On: 9/12/2017 By: Maxine Anthony Severity Noted Reaction Type Reactions Amiodarone High 08/26/2016   Side Effect Shortness of Breath Pulmonary fibrosis Current Immunizations  Reviewed on 5/24/2017 Name Date Pneumococcal Conjugate (PCV-13) 9/25/2015 Pneumococcal Polysaccharide (PPSV-23) 5/24/2017 Zoster Vaccine, Live 4/14/2014 Not reviewed this visit You Were Diagnosed With   
  
 Codes Comments Aortic valve stenosis, unspecified etiology    -  Primary ICD-10-CM: I35.0 ICD-9-CM: 424.1 Chronic atrial fibrillation (HCC)     ICD-10-CM: D53.2 ICD-9-CM: 427.31   
 Hypertensive heart disease without heart failure     ICD-10-CM: I11.9 ICD-9-CM: 402.90 Chronic diastolic congestive heart failure (HCC)     ICD-10-CM: I50.32 
ICD-9-CM: 428.32, 428.0 Vitals Smoking Status Former Smoker Preferred Pharmacy Pharmacy Name Phone [address], [address] AT [facility] OF  3 & [address] [phone] Your Updated Medication List  
  
   
This list is accurate as of: 9/12/17 11:05 AM.  Always use your most recent med list.  
  
  
  
  
 apixaban 5 mg tablet Commonly known as:  Obadiah Severe Take 5 mg by mouth two (2) times a day. finasteride 5 mg tablet Commonly known as:  PROSCAR  
TAKE 1 TABLET EVERY DAY  
  
 FUROSEMIDE PO Take 5 mg by mouth daily. levothyroxine 50 mcg tablet Commonly known as:  SYNTHROID  
  
 lisinopril 20 mg tablet Commonly known as:  PRINIVIL, ZESTRIL  
TAKE 1 TABLET TWICE DAILY (HAVE 3 MONTHS TO BE SEEN OR NO REFILLS)  
  
 loratadine 10 mg tablet Commonly known as:  Veryl Bleak Take 1 Tab by mouth daily. metoprolol tartrate 50 mg tablet Commonly known as:  LOPRESSOR  
TAKE 1 TABLET BY MOUTH EVERY 12 HOURS * predniSONE 5 mg tablet Commonly known as:  Alvie Stillaguamish Take 1 Tab by mouth daily. Indications: pulmonary fibrosis * predniSONE 1 mg tablet Commonly known as:  Alvie Stillaguamish Take 1 Tab by mouth daily (with breakfast). Indications: pulmonary fibrosis * predniSONE 10 mg tablet Commonly known as:  DELTASONE  
4 tablets daily for 2 days then 3 tablets daily for 2 days then 2 tablets daily for 2 days then 1 tablet daily for 2 days  
  
 traZODone 50 mg tablet Commonly known as:  DESYREL  
as needed. VITAMIN D3 2,000 unit Cap capsule Generic drug:  Cholecalciferol (Vitamin D3) Take  by mouth daily. * Notice:   This list has 3 medication(s) that are the same as other medications prescribed for you. Read the directions carefully, and ask your doctor or other care provider to review them with you. We Performed the Following AMB POC EKG ROUTINE W/ 12 LEADS, INTER & REP [77362 CPT(R)] Patient Instructions Stop Diovan. Start Entresto 49/51mg one tablet twice a day --prescription faxed to [address] [place] [address] Street Start Spironolactone 25mg one tablet daily--prescription faxed to [facility]. Other medications faxed to [facility] mail order. Barbie will contact you to schedule cardiac rehab and will be working on getting authorization for [facility]. Follow up with Dr. Lily Skiff in 3/12 weeks. Introducing \[facility]\"" & HEALTH SERVICES! Haris Blunt introduces HDmessaging patient portal. Now you can access parts of your medical record, email your doctor's office, and request medication refills online. 1. In your internet browser, go to [email or web address] 2. Click on the First Time User? Click Here link in the Sign In box. You will see the New Member Sign Up page. 3. Enter your HDmessaging Access Code exactly as it appears below. You will not need to use this code after youve completed the sign-up process. If you do not sign up before the expiration date, you must request a new code. · HDmessaging Access Code: [email or web address] Expires: 12/11/2017 11:05 AM 
 
4. Enter the last four digits of your Social Security Number (xxxx) and Date of Birth (mm/dd/yyyy) as indicated and click Submit. You will be taken to the next sign-up page. 5. Create a Ensygniat ID. This will be your HDmessaging login ID and cannot be changed, so think of one that is secure and easy to remember. 6. Create a HDmessaging password. You can change your password at any time. 7. Enter your Password Reset Question and Answer. This can be used at a later time if you forget your password. 8. Enter your e-mail address. You will receive e-mail notification when new information is available in [address] 9. Click Sign Up. You can now view and download portions of your medical record. 10. Click the Download Summary menu link to download a portable copy of your medical information. If you have questions, please visit the Frequently Asked Questions section of the [employer] website. Remember, [employer] is NOT to be used for urgent needs. For medical emergencies, dial 911. Now available from your iPhone and Android! Please provide this summary of care documentation to your next provider. Your primary care clinician is listed as Delia Rajput. If you have any questions after today's visit, please call [phone].

## 2017-09-12 NOTE — PROGRESS NOTES
PATIENT ID VERIFIED WITH TWO PATIENT IDENTIFIERS. MEDICATION REVIEWED AND APPROVED BY DR. Mau Joseph.

## 2018-04-20 ENCOUNTER — OFFICE VISIT (OUTPATIENT)
Dept: CARDIOLOGY CLINIC | Age: 83
End: 2018-04-20

## 2018-04-20 VITALS
SYSTOLIC BLOOD PRESSURE: 116 MMHG | DIASTOLIC BLOOD PRESSURE: 80 MMHG | BODY MASS INDEX: 27.6 KG/M2 | HEART RATE: 64 BPM | RESPIRATION RATE: 16 BRPM | HEIGHT: 75 IN | WEIGHT: 222 LBS | OXYGEN SATURATION: 94 %

## 2018-04-20 DIAGNOSIS — I48.20 CHRONIC ATRIAL FIBRILLATION (HCC): Primary | ICD-10-CM

## 2018-04-20 DIAGNOSIS — I49.5 SSS (SICK SINUS SYNDROME) (HCC): ICD-10-CM

## 2018-04-20 DIAGNOSIS — I35.0 AORTIC VALVE STENOSIS, ETIOLOGY OF CARDIAC VALVE DISEASE UNSPECIFIED: ICD-10-CM

## 2018-04-20 DIAGNOSIS — J84.9 ILD (INTERSTITIAL LUNG DISEASE) (HCC): ICD-10-CM

## 2018-04-20 DIAGNOSIS — Z95.0 CARDIAC PACEMAKER IN SITU: ICD-10-CM

## 2018-04-20 DIAGNOSIS — I50.30 DIASTOLIC CONGESTIVE HEART FAILURE, UNSPECIFIED HF CHRONICITY (HCC): ICD-10-CM

## 2018-04-20 NOTE — MR AVS SNAPSHOT
[address] [facility] [address] 
 
 
 [address] [facility] [address] [address] [phone] Patient: Familia Osborne MRN: [id number] LBP:1[birth year]90/[birth year]180 Visit Information Date & Time Provider Department Dept. Phone Encounter #  
 4/20/2018 10:00 AM Kat Rey, 1024 [facility] Cardiology [facility] [phone] [id number] Follow-up Instructions Return in about 6 months (around 10/20/2018). Follow-up and Disposition History Your Appointments 5/18/2018 11:00 AM  
PROCEDURE with Kat Rey MD  
[address] [place] [facility] Appt Note: medtronic pacemaker check $0cp [address] [facility] [address] [address] [phone]  
  
   
 [address] [address]  
  
    
 10/3/2018 10:20 AM  
ESTABLISHED PATIENT with Kat Rey MD  
[facility] Appt Note: 6 mo fu $0cp [address] [facility] [address] [address] [phone]  
  
   
 [address] [address] Upcoming Health Maintenance Date Due  
 GLAUCOMA SCREENING Q2Y 9/18/2017 MEDICARE YEARLY EXAM 5/25/2018 DTaP/Tdap/Td series (2 - Td) 5/24/2027 Allergies as of 4/20/2018  Review Complete On: 4/20/2018 By: Kat Rey MD  
  
 Severity Noted Reaction Type Reactions Amiodarone High 08/26/2016   Side Effect Shortness of Breath Pulmonary fibrosis Current Immunizations  Reviewed on 5/24/2017 Name Date Influenza High Dose Vaccine PF 9/29/2017 Pneumococcal Conjugate (PCV-13) 9/25/2015 Pneumococcal Polysaccharide (PPSV-23) 5/24/2017 Zoster Vaccine, Live 4/14/2014 Not reviewed this visit You Were Diagnosed With   
  
 Codes Comments Chronic atrial fibrillation (HCC)    -  Primary ICD-10-CM: C46.8 ICD-9-CM: 427.31  Aortic valve stenosis, etiology of cardiac valve disease unspecified ICD-10-CM: I35.0 ICD-9-CM: 424.1 Diastolic congestive heart failure, unspecified HF chronicity (HCC)     ICD-10-CM: I50.30 ICD-9-CM: 428.30, 428.0 SSS (sick sinus syndrome) (HCC)     ICD-10-CM: I49.5 ICD-9-CM: 427.81 Cardiac pacemaker in situ     ICD-10-CM: Z95.0 ICD-9-CM: V45.01   
 ILD [facility]interstitial lung disease) [facility]     ICD-10-CM: J84.9 ICD-9-CM: 894 Vitals BP Pulse Resp Height(growth percentile) Weight(growth percentile) SpO2  
 116/80 (BP 1 Location: Right arm, BP Patient Position: Sitting) 64 16 6' 3\" (1.905 m) 222 lb (100.7 kg) 94% BMI Smoking Status 27.75 kg/m2 Former Smoker Vitals History BMI and BSA Data Body Mass Index Body Surface Area  
 27.75 kg/m 2 2.31 m 2 [facility] [address] [phone] Your Updated Medication List  
  
   
This list is accurate as of 4/20/18 10:33 AM.  Always use your most recent med list.  
  
  
  
  
 apixaban 5 mg tablet Commonly known as:  Doren Gerry Take 5 mg by mouth two (2) times a day. finasteride 5 mg tablet Commonly known as:  PROSCAR  
TAKE 1 TABLET EVERY DAY  
  
 FUROSEMIDE PO Take 5 mg by mouth daily. levothyroxine 50 mcg tablet Commonly known as:  SYNTHROID  
75 mcg daily. lisinopril 20 mg tablet Commonly known as:  PRINIVIL, ZESTRIL  
TAKE 1 TABLET TWICE DAILY (HAVE 3 MONTHS TO BE SEEN OR NO REFILLS) metoprolol tartrate 50 mg tablet Commonly known as:  LOPRESSOR  
TAKE 1 TABLET BY MOUTH EVERY 12 HOURS MULTIVITAMIN PO Take  by mouth daily. Oxygen 2 LITERS NIGHTLY * predniSONE 5 mg tablet Commonly known as:  Tracey Adilson Take 1 Tab by mouth daily. Indications: pulmonary fibrosis * predniSONE 1 mg tablet Commonly known as:  Tracey Adilson Take 1 Tab by mouth daily (with breakfast). Indications: pulmonary fibrosis traZODone 50 mg tablet Commonly known as:  DESYREL  
as needed. VITAMIN D3 2,000 unit Cap capsule Generic drug:  Cholecalciferol (Vitamin D3) Take  by mouth daily. * Notice: This list has 2 medication(s) that are the same as other medications prescribed for you. Read the directions carefully, and ask your doctor or other care provider to review them with you. We Performed the Following AMB POC EKG ROUTINE W/ 12 LEADS, INTER & REP [27324 CPT(R)] Follow-up Instructions Return in about 6 months (around 10/20/2018). Introducing [facility] & HEALTH SERVICES! Shiv Muller introduces TATE'S LIST patient portal. Now you can access parts of your medical record, email your doctor's office, and request medication refills online. 1. In your internet browser, go to [email or web address] 2. Click on the First Time User? Click Here link in the Sign In box. You will see the New Member Sign Up page. 3. Enter your TATE'S LIST Access Code exactly as it appears below. You will not need to use this code after youve completed the sign-up process. If you do not sign up before the expiration date, you must request a new code. · TATE'S LIST Access Code: [email or web address] Expires: 7/19/2018  9:36 AM 
 
4. Enter the last four digits of your Social Security Number (xxxx) and Date of Birth (mm/dd/yyyy) as indicated and click Submit. You will be taken to the next sign-up page. 5. Create a TATE'S LIST ID. This will be your TATE[employer]S LIST login ID and cannot be changed, so think of one that is secure and easy to remember. 6. Create a TATE'S LIST password. You can change your password at any time. 7. Enter your Password Reset Question and Answer. This can be used at a later time if you forget your password. 8. Enter your e-mail address. You will receive e-mail notification when new information is available in [address] 9. Click Sign Up. You can now view and download portions of your medical record. 10. Click the Download Summary menu link to download a portable copy of your medical information. If you have questions, please visit the Frequently Asked Questions section of the [employer] website. Remember, [employer] is NOT to be used for urgent needs. For medical emergencies, dial 911. Now available from your iPhone and Android! Please provide this summary of care documentation to your next provider. Your primary care clinician is listed as Delia Rajput. If you have any questions after today's visit, please call [phone].

## 2018-04-20 NOTE — PROGRESS NOTES
PATIENT ID VERIFIED WITH TWO PATIENT IDENTIFIERS. PATIENT MEDICATIONS REVIEWED AND APPROVED BY DR. Tomy Peterson. MEDICATIONS THAT WERE REMOVED FROM THIS VISIT HAVE BEEN APPROVED BY DR. Tomy Peterson. Chief Complaint   Patient presents with    Irregular Heart Beat     Annual follow up    Aortic Stenosis    CHF    Hypertension       1. Have you been to the ER, urgent care clinic since your last visit? Hospitalized since your last visit? no      2. Have you seen or consulted any other health care providers outside of the [address] [place] [address] since your last visit?  Dentist in [place] for routine cleaning, Cardiologist in [place] for routine follow up, [facility] [employer] [facility] in [place] for routine follow up, Dermatology in [place] for routine follow up

## 2018-04-20 NOTE — PROGRESS NOTES
Tom Restrepo is a 80 y.o. male is here for routine f/u. Stable SMITH, no other CV sx or complaints. Has seen his regular providers in [place] over [facility]/The patient denies chest pain/ , orthopnea, PND, LE edema, palpitations, syncope, presyncope or fatigue. 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    Cushingoid side effect of steroids ([address] 05/02/2014    Aortic valve stenosis     ILD (interstitial lung disease) ([address]     Orthostatic hypotension 04/24/2013    SSS (sick sinus syndrome) (HCC)     HCVD (hypertensive cardiovascular disease)     Diastolic CHF (HCC)     CRI (chronic renal insufficiency)       Rlyey Dumont NP  Past Medical History:   Diagnosis Date    Aortic valve stenosis     Atrial fibrillation (HCC)     BPH (benign prostatic hyperplasia)     CRI (chronic renal insufficiency)     Diastolic CHF ([address]     ED (erectile dysfunction)     HCVD (hypertensive cardiovascular disease)     ILD (interstitial lung disease) ([address]     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  Orthostatic hypotension 4/24/2013    SSS (sick sinus syndrome) (HCC)       Past Surgical History:   Procedure Laterality Date    HX PACEMAKER  2001    DDD, Medtronic     Allergies   Allergen Reactions    Amiodarone Shortness of Breath     Pulmonary fibrosis      Family History   Problem Relation Age of Onset    Alcohol abuse Mother     Alcohol abuse Father       Social History     Social History    Marital status:      Spouse name: N/A    Number of children: N/A    Years of education: N/A     Occupational History    Not on file.      Social History Main Topics    Smoking status: Former Smoker     Types: Pipe     Quit date: 4/24/1983    Smokeless tobacco: Never Used    Alcohol use Yes    Drug use: Not on file    Sexual activity: Not on file     Other Topics Concern    Not on file     Social History Narrative      Current Outpatient Prescriptions   Medication Sig    MULTIVITAMIN PO Take  by mouth daily.  Oxygen 2 LITERS NIGHTLY    finasteride (PROSCAR) 5 mg tablet TAKE 1 TABLET EVERY DAY    lisinopril (PRINIVIL, ZESTRIL) 20 mg tablet TAKE 1 TABLET TWICE DAILY (HAVE 3 MONTHS TO BE SEEN OR NO REFILLS)    predniSONE (DELTASONE) 5 mg tablet Take 1 Tab by mouth daily. Indications: pulmonary fibrosis (Patient taking differently: Take 5 mg by mouth daily. Patient takes 6 mg q day. Indications: pulmonary fibrosis)    predniSONE (DELTASONE) 1 mg tablet Take 1 Tab by mouth daily (with breakfast). Indications: pulmonary fibrosis (Patient taking differently: Take 6 mg by mouth daily (with breakfast). Indications: pulmonary fibrosis)    metoprolol tartrate (LOPRESSOR) 50 mg tablet TAKE 1 TABLET BY MOUTH EVERY 12 HOURS    levothyroxine (SYNTHROID) 50 mcg tablet 75 mcg daily.  traZODone (DESYREL) 50 mg tablet as needed.  Cholecalciferol, Vitamin D3, (VITAMIN D3) 2,000 unit cap capsule Take  by mouth daily.  FUROSEMIDE PO Take 5 mg by mouth daily.  apixaban (ELIQUIS) 5 mg tablet Take 5 mg by mouth two (2) times a day. No current facility-administered medications for this visit. Review of Symptoms:    CONST  No weight change. No fever, chills, sweats    ENT No visual changes, URI sx, sore throat    CV  See HPI   RESP  No cough, or sputum, wheezing. Also see HPI   GI  No abdominal pain or change in bowel habits. No heartburn or dysphagia. No melena or rectal bleeding.   No dysuria, urgency, frequency, hematuria   MSKEL  No joint pain, swelling. No muscle pain. SKIN  No rash or lesions. NEURO  No headache, syncope, or seizure. No weakness, loss of sensation, or paresthesias. PSYCH  No low mood or depression  No anxiety. HE/LYMPH  No easy bruising, abnormal bleeding, or enlarged glands.         Physical ExamPhysical Exam:    Visit Vitals    /80 (BP 1 Location: Right arm, BP Patient Position: Sitting)    Pulse 64    Resp 16    Ht 6' 3\" (1.905 m)    Wt 222 lb (100.7 kg)    SpO2 94%    BMI 27.75 kg/m2     Gen: NAD  HEENT:  PERRL, throat clear  Neck: no adenopathy, no thyromegaly, no JVD   Heart:  Regular,Nl S1S2,  I/VI murmur, no gallop or rub.   Lungs:  clear  Abdomen:   Soft, non-tender, bowel sounds are active.   Extremities:  No edema  Pulse: symmetric  Neuro: A&O times 3, No focal neuro deficits    Cardiographics    ECG: V paced rhythm      Labs:   Lab Results   Component Value Date/Time    Sodium 145 (H) 08/10/2015 12:56 PM    Sodium 144 06/04/2015 02:18 PM    Sodium 144 05/02/2014 11:40 AM    Potassium 4.7 08/10/2015 12:56 PM    Potassium 4.5 06/04/2015 02:18 PM    Potassium 4.7 05/02/2014 11:40 AM    Chloride 101 08/10/2015 12:56 PM    Chloride 102 06/04/2015 02:18 PM    Chloride 105 05/02/2014 11:40 AM    CO2 28 08/10/2015 12:56 PM    CO2 29 06/04/2015 02:18 PM    CO2 29 (H) 05/02/2014 11:40 AM    Glucose 94 08/10/2015 12:56 PM    Glucose 106 (H) 06/04/2015 02:18 PM    Glucose 98 05/02/2014 11:40 AM    BUN 25 08/10/2015 12:56 PM    BUN 21 06/04/2015 02:18 PM    BUN 24 05/02/2014 11:40 AM    Creatinine 1.26 08/10/2015 12:56 PM    Creatinine 1.24 06/04/2015 02:18 PM    Creatinine 1.36 (H) 05/02/2014 11:40 AM    BUN/Creatinine ratio 20 08/10/2015 12:56 PM    BUN/Creatinine ratio 17 06/04/2015 02:18 PM    BUN/Creatinine ratio 18 05/02/2014 11:40 AM    GFR est AA 61 08/10/2015 12:56 PM    GFR est AA 63 06/04/2015 02:18 PM    GFR est AA 56 (L) 05/02/2014 11:40 AM    GFR est non-AA 53 (L) 08/10/2015 12:56 PM    GFR est non-AA 54 (L) 06/04/2015 02:18 PM    GFR est non-AA 49 (L) 05/02/2014 11:40 AM    Calcium 9.2 08/10/2015 12:56 PM    Calcium 9.3 06/04/2015 02:18 PM    Calcium 8.7 05/02/2014 11:40 AM    Bilirubin, total 0.6 06/04/2015 02:18 PM    Bilirubin, total 0.4 05/02/2014 11:40 AM    AST (SGOT) 19 06/04/2015 02:18 PM    AST (SGOT) 13 05/02/2014 11:40 AM    Alk. phosphatase 54 06/04/2015 02:18 PM    Alk. phosphatase 50 05/02/2014 11:40 AM    Protein, total 6.3 06/04/2015 02:18 PM    Protein, total 6.4 05/02/2014 11:40 AM    Albumin 3.6 06/04/2015 02:18 PM    Albumin 3.7 05/02/2014 11:40 AM    A-G Ratio 1.3 06/04/2015 02:18 PM    A-G Ratio 1.4 05/02/2014 11:40 AM    ALT (SGPT) 14 06/04/2015 02:18 PM    ALT (SGPT) 16 05/02/2014 11:40 AM     No results found for: CPK, CPKX, CPX  No results found for: CHOL, CHOLX, CHLST, CHOLV, 791095, HDL, LDL, LDLC, DLDLP, TGLX, TRIGL, TRIGP, CHHD, CHHDX  No results found for this or any previous visit. 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 Lipids and labs followed by PCP. Continue current care and f/u in 6 months.     Papa Caraballo MD

## 2018-05-10 ENCOUNTER — TELEPHONE (OUTPATIENT)
Dept: FAMILY MEDICINE CLINIC | Age: 83
End: 2018-05-10

## 2018-05-10 ENCOUNTER — OFFICE VISIT (OUTPATIENT)
Dept: FAMILY MEDICINE CLINIC | Age: 83
End: 2018-05-10

## 2018-05-10 VITALS
DIASTOLIC BLOOD PRESSURE: 80 MMHG | WEIGHT: 224 LBS | HEART RATE: 87 BPM | RESPIRATION RATE: 20 BRPM | SYSTOLIC BLOOD PRESSURE: 140 MMHG | HEIGHT: 75 IN | OXYGEN SATURATION: 93 % | BODY MASS INDEX: 27.85 KG/M2

## 2018-05-10 DIAGNOSIS — L97.511 ULCER OF GREAT TOE, RIGHT, LIMITED TO BREAKDOWN OF SKIN (HCC): Primary | ICD-10-CM

## 2018-05-10 RX ORDER — LEVOTHYROXINE SODIUM 75 UG/1
75 TABLET ORAL
COMMUNITY
Start: 2018-03-26

## 2018-05-10 RX ORDER — MUPIROCIN 20 MG/G
OINTMENT TOPICAL 2 TIMES DAILY
Qty: 30 G | Refills: 0 | Status: SHIPPED | OUTPATIENT
Start: 2018-05-10 | End: 2018-06-26 | Stop reason: SDUPTHER

## 2018-05-10 RX ORDER — FUROSEMIDE 20 MG/1
TABLET ORAL
COMMUNITY
Start: 2018-03-17 | End: 2018-07-02 | Stop reason: SDUPTHER

## 2018-05-10 RX ORDER — MUPIROCIN 20 MG/G
OINTMENT TOPICAL 2 TIMES DAILY
Qty: 30 G | Refills: 0 | Status: SHIPPED | OUTPATIENT
Start: 2018-05-10 | End: 2018-05-10 | Stop reason: SDUPTHER

## 2018-05-10 NOTE — TELEPHONE ENCOUNTER
Patients wife called stating patient has a sore on his foot the size of a nickel and would like to get him in for an appointment. Advised to have him come in and see Laisha this morning. Appointment set.

## 2018-05-10 NOTE — PROGRESS NOTES
Chief Complaint   Patient presents with    Foot Injury     ran into wife's walker about a week ago. Still not healing properly. HPI:      Joesph Bauer is a 80 y.o. male. He lives in [place] a majority of the time and is followed by health care providers there. 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 He does follow with pulmonology in [place]. He is on long-term low-dose prednisone. Currently at 6 mg daily    He does have chronic atrial fibrillation, hypertensive cardiovascular disease and aortic valve disease. On chronic Elliquis for anti-coagulation. Followed by Dr. Dee Dee Jacobs every 6 months. New Issues:  He has a wound on his right big toe after running into his wife's walker about a week ago. It is not healing. Has been doing [employer] soaks twice a day. Allergies   Allergen Reactions    Amiodarone Shortness of Breath     Pulmonary fibrosis       Current Outpatient Prescriptions   Medication Sig    levothyroxine (SYNTHROID) 75 mcg tablet     furosemide (LASIX) 20 mg tablet     MULTIVITAMIN PO Take  by mouth daily.  Oxygen 2 LITERS NIGHTLY    finasteride (PROSCAR) 5 mg tablet TAKE 1 TABLET EVERY DAY    lisinopril (PRINIVIL, ZESTRIL) 20 mg tablet TAKE 1 TABLET TWICE DAILY (HAVE 3 MONTHS TO BE SEEN OR NO REFILLS)    predniSONE (DELTASONE) 5 mg tablet Take 1 Tab by mouth daily. Indications: pulmonary fibrosis (Patient taking differently: Take 5 mg by mouth daily. Patient takes 6 mg q day. Indications: pulmonary fibrosis)    predniSONE (DELTASONE) 1 mg tablet Take 1 Tab by mouth daily (with breakfast). Indications: pulmonary fibrosis (Patient taking differently: Take 6 mg by mouth daily (with breakfast). Indications: pulmonary fibrosis)    metoprolol tartrate (LOPRESSOR) 50 mg tablet TAKE 1 TABLET BY MOUTH EVERY 12 HOURS    traZODone (DESYREL) 50 mg tablet as needed.     Cholecalciferol, Vitamin D3, (VITAMIN D3) 2,000 unit cap capsule Take  by mouth daily.  apixaban (ELIQUIS) 5 mg tablet Take 5 mg by mouth two (2) times a day. No current facility-administered medications for this visit. Past Medical History:   Diagnosis Date    Aortic valve stenosis     Atrial fibrillation (HCC)     BPH (benign prostatic hyperplasia)     CRI (chronic renal insufficiency)     Diastolic CHF (HCC)     ED (erectile dysfunction)     HCVD (hypertensive cardiovascular disease)     ILD (interstitial lung disease) (HCC)     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  Orthostatic hypotension 4/24/2013    SSS (sick sinus syndrome) (HCC)        Past Surgical History:   Procedure Laterality Date    HX PACEMAKER  2001    DDD, Medtronic       Social History     Social History    Marital status:      Spouse name: N/A    Number of children: N/A    Years of education: N/A     Social History Main Topics    Smoking status: Former Smoker     Types: Pipe     Quit date: 4/24/1983    Smokeless tobacco: Never Used    Alcohol use Yes    Drug use: None    Sexual activity: Not Asked     Other Topics Concern    None     Social History Narrative       Family History   Problem Relation Age of Onset    Alcohol abuse Mother     Alcohol abuse Father        Above history reviewed. ROS:  Denies fever, chills, cough, chest pain, SOB,  nausea, vomiting, or diarrhea. Denies wt loss, wt gain, hemoptysis, hematochezia or melena. Physical Examination:    /80 (BP 1 Location: Right arm, BP Patient Position: Sitting)  Pulse 87  Resp 20  Ht 6' 3\" (1.905 m)  Wt 224 lb (101.6 kg)  SpO2 93%  BMI 28 kg/m2    General: Alert and Ox3, Fluent speech  Neck:  Supple, no adenopathy, JVD, mass or bruit  Chest:  Clear to Ausculation, without wheezes, rales, rubs or rhonchi. Diminished in left base. Cardiac: IRRR  Extremities:  No cyanosis, clubbing or edema  Neurologic:  Ambulatory without assist, CN 2-12 grossly intact. Moves all extremities.   Skin: Right 1st MTP with ulcer on medial surface limited to breakdown of skin about the size of a nickel with pink center. Small amount of serosanguinous drainage on dressing. Lymphadenopathy: no cervical or supraclavicular nodes    ASSESSMENT AND PLAN:     1. Ulcer of great toe, right, limited to breakdown of skin [facility])  Patient is not diabetic, but is on long term prednisone and Eliquis. Start Mupirocin in thin layer BID  Alternate Epson Salt soaks and vinegar soaks. Change dressing BID  RTC if febrile, purulent drainage, erythema around wound or wound not improving  - mupirocin (BACTROBAN) 2 % ointment; Apply  to affected area two (2) times a day.   Dispense: 30 g; Refill: 0     RTC PRN    Navya Hernandez NP

## 2018-05-10 NOTE — MR AVS SNAPSHOT
[address] [facility] 
 
 
 [address] [address] [phone] Patient: Dayana Oliver MRN: [id number] SEB:[birth year] Visit Information Date & Time Provider Department Dept. Phone Encounter #  
 5/10/2018  4:40 PM Rafael Gurrola NP Vimal Clark [id number] Follow-up Instructions Return if symptoms worsen or fail to improve. Follow-up and Disposition History Your Appointments 5/10/2018  4:40 PM  
ESTABLISHED PATIENT with ANTOINE [address]yn[address] ([facility]) Appt Note: PER JUDSON/sore on bottom of outside foot for 1 week and not healing [address] [address] [phone]  
  
   
 [address] [place] [address],[address] [address] 5/18/2018 11:00 AM  
PROCEDURE with Reese Lindsey MD  
[address] [facility] [place] [facility]) Appt Note: medtronic pacemaker check $0cp [address] [facility] [address] [address] [phone]  
  
   
 [address] [address]  
  
    
 10/3/2018 10:20 AM  
ESTABLISHED PATIENT with Reese Lindsey MD  
[facility]) Appt Note: 6 mo fu $0cp [address] [facility] [address] [phone]  
  
   
 [address] [address] Upcoming Health Maintenance Date Due  
 GLAUCOMA SCREENING Q2Y 9/18/2017 MEDICARE YEARLY EXAM 5/25/2018 Influenza Age 5 to Adult 8/1/2018 DTaP/Tdap/Td series (2 - Td) 5/24/2027 Allergies as of 5/10/2018  Review Complete On: 5/10/2018 By: Rafael Gurrola NP Severity Noted Reaction Type Reactions Amiodarone High 08/26/2016   Side Effect Shortness of Breath Pulmonary fibrosis Current Immunizations  Reviewed on 5/24/2017 Name Date Influenza High Dose Vaccine PF 9/29/2017 Pneumococcal Conjugate (PCV-13) 9/25/2015 Pneumococcal Polysaccharide (PPSV-23) 5/24/2017 Zoster Vaccine, Live 4/14/2014 Not reviewed this visit You Were Diagnosed With   
  
 Codes Comments Ulcer of great toe, right, limited to breakdown of skin ([facility]    -  Primary ICD-10-CM: Z46.960 ICD-9-CM: 707.15 Vitals BP Pulse Resp Height(growth percentile) Weight(growth percentile) SpO2  
 140/80 (BP 1 Location: Right arm, BP Patient Position: Sitting) 87 20 6' 3\" (1.905 m) 224 lb (101.6 kg) 93% BMI Smoking Status 28 kg/m2 Former Smoker Vitals History BMI and BSA Data Body Mass Index Body Surface Area  
 28 kg/m 2 2.32 m 2 Preferred Pharmacy Pharmacy Name Phone [facility], [address] AT [facility]  [address] [facility] [place] [facility] Juan Carlos Liao [phone] Your Updated Medication List  
  
   
This list is accurate as of 5/10/18  3:52 PM.  Always use your most recent med list.  
  
  
  
  
 apixaban 5 mg tablet Commonly known as:  Igor Royals Take 5 mg by mouth two (2) times a day. finasteride 5 mg tablet Commonly known as:  PROSCAR  
TAKE 1 TABLET EVERY DAY  
  
 furosemide 20 mg tablet Commonly known as:  LASIX  
  
 levothyroxine 75 mcg tablet Commonly known as:  SYNTHROID  
  
 lisinopril 20 mg tablet Commonly known as:  PRINIVIL, ZESTRIL  
TAKE 1 TABLET TWICE DAILY (HAVE 3 MONTHS TO BE SEEN OR NO REFILLS) metoprolol tartrate 50 mg tablet Commonly known as:  LOPRESSOR  
TAKE 1 TABLET BY MOUTH EVERY 12 HOURS MULTIVITAMIN PO Take  by mouth daily. mupirocin 2 % ointment Commonly known as:  [employer] Apply  to affected area two (2) times a day. Oxygen 2 LITERS NIGHTLY * predniSONE 5 mg tablet Commonly known as:  [place] Burner Take 1 Tab by mouth daily. Indications: pulmonary fibrosis * predniSONE 1 mg tablet Commonly known as:  Katie Burner Take 1 Tab by mouth daily (with breakfast).  Indications: pulmonary fibrosis  
  
 traZODone 50 mg tablet Commonly known as:  DESYREL  
as needed. VITAMIN D3 2,000 unit Cap capsule Generic drug:  Cholecalciferol (Vitamin D3) Take  by mouth daily. * Notice: This list has 2 medication(s) that are the same as other medications prescribed for you. Read the directions carefully, and ask your doctor or other care provider to review them with you. Prescriptions Sent to Pharmacy Refills  
 mupirocin (BACTROBAN) 2 % ointment 0 Sig: Apply  to affected area two (2) times a day. Class: Normal  
 Pharmacy: [facility] [place] [address], [address] [place] [address] [address]  #: [phone] Route: Topical  
  
Follow-up Instructions Return if symptoms worsen or fail to improve. Patient Instructions If you have any questions regarding velingo, you may call velingo support at [phone]. Introducing [facility] SERVICES! Rachel Burrows introduces [employer] patient portal. Now you can access parts of your medical record, email your doctor's office, and request medication refills online. 1. In your internet browser, go to [email or web address] 2. Click on the First Time User? Click Here link in the Sign In box. You will see the New Member Sign Up page. 3. Enter your [employer] Access Code exactly as it appears below. You will not need to use this code after youve completed the sign-up process. If you do not sign up before the expiration date, you must request a new code. · [employer] Access Code: [email or web address] Expires: 7/19/2018  9:36 AM 
 
4. Enter the last four digits of your Social Security Number (xxxx) and Date of Birth (mm/dd/yyyy) as indicated and click Submit. You will be taken to the next sign-up page. 5. Create a Beanupt ID. This will be your Beanupt login ID and cannot be changed, so think of one that is secure and easy to remember. 6. Create a [email or web address] password. You can change your password at any time. 7. Enter your Password Reset Question and Answer. This can be used at a later time if you forget your password. 8. Enter your e-mail address. You will receive e-mail notification when new information is available in [address] 9. Click Sign Up. You can now view and download portions of your medical record. 10. Click the Download Summary menu link to download a portable copy of your medical information. If you have questions, please visit the Frequently Asked Questions section of the [email or web address] website. Remember, [email or web address] is NOT to be used for urgent needs. For medical emergencies, dial 911. Now available from your iPhone and Android! Please provide this summary of care documentation to your next provider. Your primary care clinician is listed as Emily Duong. If you have any questions after today's visit, please call [phone].

## 2018-05-18 ENCOUNTER — OFFICE VISIT (OUTPATIENT)
Dept: CARDIOLOGY CLINIC | Age: 83
End: 2018-05-18

## 2018-05-18 ENCOUNTER — CLINICAL SUPPORT (OUTPATIENT)
Dept: CARDIOLOGY CLINIC | Age: 83
End: 2018-05-18

## 2018-05-18 DIAGNOSIS — I49.5 SSS (SICK SINUS SYNDROME) (HCC): ICD-10-CM

## 2018-05-18 DIAGNOSIS — Z95.0 CARDIAC PACEMAKER IN SITU: ICD-10-CM

## 2018-05-18 DIAGNOSIS — I48.20 CHRONIC ATRIAL FIBRILLATION (HCC): ICD-10-CM

## 2018-05-18 DIAGNOSIS — Z95.0 PACEMAKER: Primary | ICD-10-CM

## 2018-05-18 DIAGNOSIS — I48.20 CHRONIC ATRIAL FIBRILLATION (HCC): Primary | ICD-10-CM

## 2018-06-26 DIAGNOSIS — L97.511 ULCER OF GREAT TOE, RIGHT, LIMITED TO BREAKDOWN OF SKIN (HCC): ICD-10-CM

## 2018-06-26 RX ORDER — MUPIROCIN 20 MG/G
OINTMENT TOPICAL
Qty: 22 G | Refills: 0 | Status: SHIPPED | COMMUNITY
Start: 2018-06-26 | End: 2019-07-31

## 2018-07-02 NOTE — TELEPHONE ENCOUNTER
Pts wife (on hippa) is asking for his cardiology medications to be filled at [address] [facility]. Verified patient with two patient identifiers. Pt or thewife is to call me back regarding dose and frequency of medications.

## 2018-07-23 RX ORDER — METOPROLOL TARTRATE 50 MG/1
50 TABLET ORAL 2 TIMES DAILY
Qty: 180 TAB | Refills: 1 | Status: SHIPPED | OUTPATIENT
Start: 2018-07-23

## 2018-07-23 RX ORDER — FUROSEMIDE 20 MG/1
20 TABLET ORAL DAILY
Qty: 90 TAB | Refills: 1 | Status: SHIPPED | OUTPATIENT
Start: 2018-07-23 | End: 2018-08-16 | Stop reason: SDUPTHER

## 2018-07-23 RX ORDER — LISINOPRIL 20 MG/1
20 TABLET ORAL 2 TIMES DAILY
Qty: 180 TAB | Refills: 1 | Status: SHIPPED | OUTPATIENT
Start: 2018-07-23 | End: 2019-07-22 | Stop reason: SDUPTHER

## 2018-07-23 NOTE — TELEPHONE ENCOUNTER
PT WALKED IN. Verified patient with two patient identifiers. Went over the medications with the pt. Verbal order per Dr. Mayito Fagan to refill furosemide, lisinopril, metoprolol, and eliquis. Orders repeated and verified twice.

## 2018-08-16 RX ORDER — FUROSEMIDE 20 MG/1
20 TABLET ORAL DAILY
Qty: 7 TAB | Refills: 1 | Status: SHIPPED | OUTPATIENT
Start: 2018-08-16 | End: 2018-08-16 | Stop reason: SDUPTHER

## 2018-08-16 NOTE — TELEPHONE ENCOUNTER
Mail order has been lost, having to wait for reorder. Can short term supply be sent to [facility].

## 2018-08-17 RX ORDER — FINASTERIDE 5 MG/1
TABLET, FILM COATED ORAL
Qty: 90 TAB | Refills: 3 | Status: SHIPPED | OUTPATIENT
Start: 2018-08-17

## 2018-08-17 RX ORDER — FUROSEMIDE 20 MG/1
TABLET ORAL
Qty: 90 TAB | Refills: 1 | Status: SHIPPED | OUTPATIENT
Start: 2018-08-17

## 2018-09-25 ENCOUNTER — TELEPHONE (OUTPATIENT)
Dept: CARDIOLOGY CLINIC | Age: 83
End: 2018-09-25

## 2018-09-25 NOTE — TELEPHONE ENCOUNTER
Pt advised (2 identifiers) per Dr Yanna Sotelo that he should contact PCP for this information. Pt acknowledged understanding and will contact PCP.

## 2018-09-25 NOTE — TELEPHONE ENCOUNTER
Please call patient he wants to talk or needs help in getting a breath test that detects Parkinson. [phone].     Thanks  Rollene Ormond [id number]

## 2018-10-03 ENCOUNTER — OFFICE VISIT (OUTPATIENT)
Dept: CARDIOLOGY CLINIC | Age: 83
End: 2018-10-03

## 2018-10-03 VITALS
HEIGHT: 75 IN | DIASTOLIC BLOOD PRESSURE: 56 MMHG | HEART RATE: 58 BPM | WEIGHT: 217.5 LBS | RESPIRATION RATE: 18 BRPM | OXYGEN SATURATION: 94 % | BODY MASS INDEX: 27.04 KG/M2 | SYSTOLIC BLOOD PRESSURE: 90 MMHG

## 2018-10-03 DIAGNOSIS — T46.2X5A AMIODARONE PULMONARY TOXICITY: ICD-10-CM

## 2018-10-03 DIAGNOSIS — Z95.0 CARDIAC PACEMAKER IN SITU: ICD-10-CM

## 2018-10-03 DIAGNOSIS — I49.5 SSS (SICK SINUS SYNDROME) (HCC): Primary | ICD-10-CM

## 2018-10-03 DIAGNOSIS — I48.20 CHRONIC ATRIAL FIBRILLATION (HCC): ICD-10-CM

## 2018-10-03 DIAGNOSIS — J84.9 ILD (INTERSTITIAL LUNG DISEASE) (HCC): ICD-10-CM

## 2018-10-03 DIAGNOSIS — J98.4 AMIODARONE PULMONARY TOXICITY: ICD-10-CM

## 2018-10-03 DIAGNOSIS — Z79.01 CHRONIC ANTICOAGULATION: ICD-10-CM

## 2018-10-03 DIAGNOSIS — I35.0 AORTIC VALVE STENOSIS, UNSPECIFIED ETIOLOGY: ICD-10-CM

## 2018-10-03 NOTE — MR AVS SNAPSHOT
[address] [place] [address] [phone] Patient: Mireya Laguerre MRN: [id number] OMZ:[birth year] Visit Information Date & Time Provider Department Dept. Phone Encounter #  
 10/3/2018 10:20 AM Evelin Silva, [address] [facility] [phone] [id number] Upcoming Health Maintenance Date Due Shingrix Vaccine Age 50> (1 of 2) 2/26/1984 GLAUCOMA SCREENING Q2Y 9/18/2017 MEDICARE YEARLY EXAM 5/25/2018 Influenza Age 5 to Adult 8/1/2018 DTaP/Tdap/Td series (2 - Td) 5/24/2027 Allergies as of 10/3/2018  Review Complete On: 10/3/2018 By: Beulah Moon LPN Severity Noted Reaction Type Reactions Amiodarone High 08/26/2016   Side Effect Shortness of Breath Pulmonary fibrosis Current Immunizations  Reviewed on 5/24/2017 Name Date Influenza High Dose Vaccine PF 9/29/2017 Pneumococcal Conjugate (PCV-13) 9/25/2015 Pneumococcal Polysaccharide (PPSV-23) 5/24/2017 Zoster Vaccine, Live 4/14/2014 Not reviewed this visit You Were Diagnosed With   
  
 Codes Comments SSS (sick sinus syndrome) (HCC)    -  Primary ICD-10-CM: I49.5 ICD-9-CM: 427.81 Chronic atrial fibrillation (HCC)     ICD-10-CM: S24.0 ICD-9-CM: 427.31 Cardiac pacemaker in situ     ICD-10-CM: Z95.0 ICD-9-CM: V45.01 Aortic valve stenosis, unspecified etiology     ICD-10-CM: I35.0 ICD-9-CM: 424.1 Amiodarone pulmonary toxicity     ICD-10-CM: J98.4, T46.2X5A 
ICD-9-CM: 508.8, E980.4 ILD (interstitial lung disease) ([facility]     ICD-10-CM: J84.9 ICD-9-CM: 143 Chronic anticoagulation     ICD-10-CM: Z79.01 
ICD-9-CM: V58.61 Vitals BP Pulse Resp Height(growth percentile) Weight(growth percentile) SpO2  
 90/56 (BP 1 Location: Left arm, BP Patient Position: Sitting) (!) 58 18 6' 3\" (1.905 m) 217 lb 8 oz (98.7 kg) 94% BMI Smoking Status 27.19 kg/m2 Former Smoker Vitals History BMI and BSA Data Body Mass Index Body Surface Area  
 27.19 kg/m 2 2.29 m 2 Preferred Pharmacy Pharmacy Name Phone [facility] [address] [place] [address] [phone] Your Updated Medication List  
  
   
This list is accurate as of 10/3/18 10:54 AM.  Always use your most recent med list.  
  
  
  
  
 apixaban 5 mg tablet Commonly known as:  Bella Setting Take 1 Tab by mouth two (2) times a day. finasteride 5 mg tablet Commonly known as:  PROSCAR  
TAKE 1 TABLET EVERY DAY  
  
 furosemide 20 mg tablet Commonly known as:  LASIX TAKE 1 TABLET BY MOUTH DAILY  
  
 levothyroxine 75 mcg tablet Commonly known as:  SYNTHROID  
75 mcg Daily (before breakfast). lisinopril 20 mg tablet Commonly known as:  Durward Kinds Take 1 Tab by mouth two (2) times a day. metoprolol tartrate 50 mg tablet Commonly known as:  LOPRESSOR Take 1 Tab by mouth two (2) times a day. MULTIVITAMIN PO Take  by mouth daily. mupirocin 2 % ointment Commonly known as:  BACTROBAN  
APPLY  TO AFFECTED AREA TWO TIMES A DAY. Oxygen 2 LITERS NIGHTLY * predniSONE 5 mg tablet Commonly known as:  Bobbetta Sprain Take 1 Tab by mouth daily. Indications: pulmonary fibrosis * predniSONE 1 mg tablet Commonly known as:  Bobbetta Sprain Take 1 Tab by mouth daily (with breakfast). Indications: pulmonary fibrosis  
  
 traZODone 50 mg tablet Commonly known as:  DESYREL  
as needed. VITAMIN D3 2,000 unit Cap capsule Generic drug:  Cholecalciferol (Vitamin D3) Take  by mouth daily. * Notice: This list has 2 medication(s) that are the same as other medications prescribed for you. Read the directions carefully, and ask your doctor or other care provider to review them with you. We Performed the Following AMB POC EKG ROUTINE W/ 12 LEADS, INTER & REP [05959 CPT(R)] Introducing [facility] & HEALTH SERVICES! Armani Mcqeuen introduces [employer] patient portal. Now you can access parts of your medical record, email your doctor's office, and request medication refills online. 1. In your internet browser, go to [email or web address] 2. Click on the First Time User? Click Here link in the Sign In box. You will see the New Member Sign Up page. 3. Enter your [employer] Access Code exactly as it appears below. You will not need to use this code after youve completed the sign-up process. If you do not sign up before the expiration date, you must request a new code. · [employer] Access Code: [email or web address] Expires: 1/1/2019 10:11 AM 
 
4. Enter the last four digits of your Social Security Number (xxxx) and Date of Birth (mm/dd/yyyy) as indicated and click Submit. You will be taken to the next sign-up page. 5. Create a [employer] ID. This will be your [employer] login ID and cannot be changed, so think of one that is secure and easy to remember. 6. Create a [employer] password. You can change your password at any time. 7. Enter your Password Reset Question and Answer. This can be used at a later time if you forget your password. 8. Enter your e-mail address. You will receive e-mail notification when new information is available in [address] 9. Click Sign Up. You can now view and download portions of your medical record. 10. Click the Download Summary menu link to download a portable copy of your medical information. If you have questions, please visit the Frequently Asked Questions section of the [employer] website. Remember, [employer] is NOT to be used for urgent needs. For medical emergencies, dial 911. Now available from your iPhone and Android! Please provide this summary of care documentation to your next provider. Your primary care clinician is listed as Andres Blake.  If you have any questions after today's visit, please call [phone].

## 2018-10-03 NOTE — PROGRESS NOTES
1. Have you been to the ER, urgent care clinic since your last visit? Hospitalized since your last visit? No.    2. Have you seen or consulted any other health care providers outside of the [address] [place] [address] since your last visit? Include any pap smears or colon screening.    No.      Chief Complaint   Patient presents with    Hypertension     6 month- pt denies any cardiac symptoms

## 2018-10-03 NOTE — PROGRESS NOTES
Alvina Cruz is a 80 y.o. male is here for routine f/u. Stable SMITH, no other CV sx or complaints. Has seen his regular providers in [place] over [address], [place]. Had PPM check in 5/18. Chronic afib on anticoag, SSS, s/p pacemaker, ILD with pulmonary fibrosis/amiodarone toxicity. Getting ready to leave for [place]--has PCP there (follows labs, etc). Some fatigue, sleep issues. Using home O2 at night. The patient denies chest pain/ , orthopnea, PND, LE edema, palpitations, syncope, presyncope. 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    Cushingoid side effect of steroids ([facility] 05/02/2014    Aortic valve stenosis     ILD (interstitial lung disease) ([facility]     Orthostatic hypotension 04/24/2013    SSS (sick sinus syndrome) (HCC)     HCVD (hypertensive cardiovascular disease)     Diastolic CHF (HCC)     CRI (chronic renal insufficiency)       Berkley Eng NP  Past Medical History:   Diagnosis Date    Aortic valve stenosis     Atrial fibrillation (HCC)     BPH (benign prostatic hyperplasia)     CRI (chronic renal insufficiency)     Diastolic CHF ([address]     ED (erectile dysfunction)     HCVD (hypertensive cardiovascular disease)     ILD (interstitial lung disease) ([address]     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  Orthostatic hypotension 4/24/2013    SSS (sick sinus syndrome) (HCC)       Past Surgical History:   Procedure Laterality Date    HX PACEMAKER  2001    DDD, [employer]     Allergies   Allergen Reactions    Amiodarone Shortness of Breath     Pulmonary fibrosis      Family History   Problem Relation Age of Onset    Alcohol abuse Mother     Alcohol abuse Father       Social History     Social History    Marital status:      Spouse name: N/A    Number of children: N/A    Years of education: N/A     Occupational History    Not on file. Social History Main Topics    Smoking status: Former Smoker     Types: Pipe     Quit date: 4/24/1983    Smokeless tobacco: Never Used    Alcohol use Yes      Comment: occasionally    Drug use: No    Sexual activity: Not on file     Other Topics Concern    Not on file     Social History Narrative      Current Outpatient Prescriptions   Medication Sig    finasteride (PROSCAR) 5 mg tablet TAKE 1 TABLET EVERY DAY    furosemide (LASIX) 20 mg tablet TAKE 1 TABLET BY MOUTH DAILY    apixaban (ELIQUIS) 5 mg tablet Take 1 Tab by mouth two (2) times a day.  metoprolol tartrate (LOPRESSOR) 50 mg tablet Take 1 Tab by mouth two (2) times a day.  lisinopril (PRINIVIL, ZESTRIL) 20 mg tablet Take 1 Tab by mouth two (2) times a day.  mupirocin (BACTROBAN) 2 % ointment APPLY  TO AFFECTED AREA TWO TIMES A DAY.  levothyroxine (SYNTHROID) 75 mcg tablet 75 mcg Daily (before breakfast).  MULTIVITAMIN PO Take  by mouth daily.  Oxygen 2 LITERS NIGHTLY    predniSONE (DELTASONE) 1 mg tablet Take 1 Tab by mouth daily (with breakfast). Indications: pulmonary fibrosis (Patient taking differently: Take 6 mg by mouth daily (with breakfast). Indications: pulmonary fibrosis)    traZODone (DESYREL) 50 mg tablet as needed.  Cholecalciferol, Vitamin D3, (VITAMIN D3) 2,000 unit cap capsule Take  by mouth daily.  predniSONE (DELTASONE) 5 mg tablet Take 1 Tab by mouth daily. Indications: pulmonary fibrosis (Patient taking differently: Take 5 mg by mouth daily. Patient takes 6 mg q day. Indications: pulmonary fibrosis)     No current facility-administered medications for this visit. Review of Symptoms:    CONST  No weight change. No fever, chills, sweats    ENT No visual changes, URI sx, sore throat    CV  See HPI   RESP  No cough, or sputum, wheezing. Also see HPI   GI  No abdominal pain or change in bowel habits. No heartburn or dysphagia. No melena or rectal bleeding.       No dysuria, urgency, frequency, hematuria   MSKEL  No joint pain, swelling. No muscle pain. SKIN  No rash or lesions. NEURO  No headache, syncope, or seizure. No weakness, loss of sensation, or paresthesias. PSYCH  No low mood or depression  No anxiety. HE/LYMPH  No easy bruising, abnormal bleeding, or enlarged glands.         Physical ExamPhysical Exam:    Visit Vitals    BP 90/56 (BP 1 Location: Left arm, BP Patient Position: Sitting)    Pulse (!) 58    Resp 18    Ht 6' 3\" (1.905 m)    Wt 217 lb 8 oz (98.7 kg)    SpO2 94%    BMI 27.19 kg/m2     Gen: NAD  HEENT:  PERRL, throat clear  Neck: no adenopathy, no thyromegaly, no JVD   Heart:  Regular,Nl S1S2,  no murmur, gallop or rub.   Lungs:  clear  Abdomen:   Soft, non-tender, bowel sounds are active.   Extremities:  No edema  Pulse: symmetric  Neuro: A&O times 3, No focal neuro deficits    Cardiographics    ECG: afib, rate controlled, LBBB/IVCD, demand V pacing      Labs:   Lab Results   Component Value Date/Time    Sodium 145 (H) 08/10/2015 12:56 PM    Sodium 144 06/04/2015 02:18 PM    Sodium 144 05/02/2014 11:40 AM    Potassium 4.7 08/10/2015 12:56 PM    Potassium 4.5 06/04/2015 02:18 PM    Potassium 4.7 05/02/2014 11:40 AM    Chloride 101 08/10/2015 12:56 PM    Chloride 102 06/04/2015 02:18 PM    Chloride 105 05/02/2014 11:40 AM    CO2 28 08/10/2015 12:56 PM    CO2 29 06/04/2015 02:18 PM    CO2 29 (H) 05/02/2014 11:40 AM    Glucose 94 08/10/2015 12:56 PM    Glucose 106 (H) 06/04/2015 02:18 PM    Glucose 98 05/02/2014 11:40 AM    BUN 25 08/10/2015 12:56 PM    BUN 21 06/04/2015 02:18 PM    BUN 24 05/02/2014 11:40 AM    Creatinine 1.26 08/10/2015 12:56 PM    Creatinine 1.24 06/04/2015 02:18 PM    Creatinine 1.36 (H) 05/02/2014 11:40 AM    BUN/Creatinine ratio 20 08/10/2015 12:56 PM    BUN/Creatinine ratio 17 06/04/2015 02:18 PM    BUN/Creatinine ratio 18 05/02/2014 11:40 AM    GFR est AA 61 08/10/2015 12:56 PM    GFR est AA 63 06/04/2015 02:18 PM    GFR est AA 56 (L) 05/02/2014 11:40 AM    GFR est non-AA 53 (L) 08/10/2015 12:56 PM    GFR est non-AA 54 (L) 06/04/2015 02:18 PM    GFR est non-AA 49 (L) 05/02/2014 11:40 AM    Calcium 9.2 08/10/2015 12:56 PM    Calcium 9.3 06/04/2015 02:18 PM    Calcium 8.7 05/02/2014 11:40 AM    Bilirubin, total 0.6 06/04/2015 02:18 PM    Bilirubin, total 0.4 05/02/2014 11:40 AM    AST (SGOT) 19 06/04/2015 02:18 PM    AST (SGOT) 13 05/02/2014 11:40 AM    Alk. phosphatase 54 06/04/2015 02:18 PM    Alk. phosphatase 50 05/02/2014 11:40 AM    Protein, total 6.3 06/04/2015 02:18 PM    Protein, total 6.4 05/02/2014 11:40 AM    Albumin 3.6 06/04/2015 02:18 PM    Albumin 3.7 05/02/2014 11:40 AM    A-G Ratio 1.3 06/04/2015 02:18 PM    A-G Ratio 1.4 05/02/2014 11:40 AM    ALT (SGPT) 14 06/04/2015 02:18 PM    ALT (SGPT) 16 05/02/2014 11:40 AM     No results found for: CPK, CPKX, CPX  No results found for: CHOL, CHOLX, CHLST, CHOLV, 212363, HDL, LDL, LDLC, DLDLP, TGLX, TRIGL, TRIGP, CHHD, CHHDX  No results found for this or any previous visit. 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 Has seen his regular providers in [place] over [address], [place]. Had PPM check in 5/18. Chronic afib on anticoag, SSS, s/p pacemaker, ILD with pulmonary fibrosis/amiodarone toxicity. Getting ready to leave for [place]. Some fatigue, sleep issues.       Plan:     Overall stable from CV standpoint, multiple issues as noted  Will see PCP in [place] for regular checks/labs, etc.  Lipids and labs followed by PCP. Continue current care and f/u in 6 months.     Taylor Licona MD

## 2019-07-31 ENCOUNTER — OFFICE VISIT (OUTPATIENT)
Dept: CARDIOLOGY CLINIC | Age: 84
End: 2019-07-31

## 2019-07-31 VITALS
OXYGEN SATURATION: 97 % | DIASTOLIC BLOOD PRESSURE: 82 MMHG | HEIGHT: 75 IN | SYSTOLIC BLOOD PRESSURE: 140 MMHG | WEIGHT: 214 LBS | HEART RATE: 57 BPM | BODY MASS INDEX: 26.61 KG/M2 | RESPIRATION RATE: 16 BRPM

## 2019-07-31 DIAGNOSIS — T46.2X5A AMIODARONE PULMONARY TOXICITY: ICD-10-CM

## 2019-07-31 DIAGNOSIS — Z79.01 CHRONIC ANTICOAGULATION: ICD-10-CM

## 2019-07-31 DIAGNOSIS — Z95.0 CARDIAC PACEMAKER IN SITU: ICD-10-CM

## 2019-07-31 DIAGNOSIS — I49.5 SSS (SICK SINUS SYNDROME) (HCC): ICD-10-CM

## 2019-07-31 DIAGNOSIS — J98.4 AMIODARONE PULMONARY TOXICITY: ICD-10-CM

## 2019-07-31 DIAGNOSIS — J84.9 ILD (INTERSTITIAL LUNG DISEASE) (HCC): ICD-10-CM

## 2019-07-31 DIAGNOSIS — I35.0 AORTIC VALVE STENOSIS, UNSPECIFIED ETIOLOGY: ICD-10-CM

## 2019-07-31 DIAGNOSIS — I48.20 CHRONIC ATRIAL FIBRILLATION (HCC): Primary | ICD-10-CM

## 2019-07-31 DIAGNOSIS — I50.30 DIASTOLIC CONGESTIVE HEART FAILURE, UNSPECIFIED HF CHRONICITY (HCC): ICD-10-CM

## 2019-07-31 DIAGNOSIS — I45.2: ICD-10-CM

## 2019-07-31 NOTE — PROGRESS NOTES
PATIENT ID VERIFIED WITH TWO PATIENT IDENTIFIERS. PATIENT MEDICATIONS REVIEWED AND APPROVED BY DR. Priscilla Nagy. MEDICATIONS THAT WERE REMOVED FROM THIS VISIT HAVE BEEN APPROVED BY DR. Priscilla Nagy. REMOVED:  BACTROBAN OINTMENT, VITAMIN D3  Chief Complaint   Patient presents with    Irregular Heart Beat     10 month follow up    Aortic Stenosis    CHF    Hypertension    Other     Blood pressure problem       1. Have you been to the ER, urgent care clinic since your last visit? Hospitalized since your last visit? no    2. Have you seen or consulted any other health care providers outside of the [address] [place] [address] since your last visit? Include any pap smears or colon screening?   Yes multiple doctors in [place] during the winter

## 2019-07-31 NOTE — PROGRESS NOTES
Armin Miranda is a 80 y.o. male is here for routine f/u. Stable SMITH, no other CV sx or complaints. Had PPM check in 5/18. Chronic afib on anticoag, SSS, s/p pacemaker, ILD with pulmonary fibrosis/amiodarone toxicity. Mary Morales in  [place] PCP there (follows labs, etc), not able to see Cardiology there. Some fatigue, sleep issues. Using home O2 at night. The patient denies chest pain, orthopnea, PND, LE edema, palpitations, syncope, presyncope or fatigue. 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    Cushingoid side effect of steroids ([address] 05/02/2014    Aortic valve stenosis     ILD (interstitial lung disease) ([address]     Orthostatic hypotension 04/24/2013    SSS (sick sinus syndrome) (HCC)     HCVD (hypertensive cardiovascular disease)     Diastolic CHF (HCC)     CRI (chronic renal insufficiency)       Nat Hinojosa MD  Past Medical History:   Diagnosis Date    Aortic valve stenosis     Atrial fibrillation (HCC)     BPH (benign prostatic hyperplasia)     CRI (chronic renal insufficiency)     Diastolic CHF ([address]     ED (erectile dysfunction)     HCVD (hypertensive cardiovascular disease)     ILD (interstitial lung disease) ([address]     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     Orthostatic hypotension 4/24/2013    SSS (sick sinus syndrome) (HCC)       Past Surgical History:   Procedure Laterality Date    HX PACEMAKER  2001    DDD, [employer]     Allergies   Allergen Reactions    Amiodarone Shortness of Breath     Pulmonary fibrosis      Family History   Problem Relation Age of Onset    Alcohol abuse Mother     Alcohol abuse Father       Social History     Socioeconomic History    Marital status:      Spouse name: Not on file    Number of children: Not on file    Years of education: Not on file    Highest education level: Not on file   Occupational History    Not on file   Social Needs    Financial resource strain: Not on file    Food insecurity:     Worry: Not on file     Inability: Not on file    Transportation needs:     Medical: Not on file     Non-medical: Not on file   Tobacco Use    Smoking status: Former Smoker     Types: Pipe     Last attempt to quit: 1983     Years since quittin.2    Smokeless tobacco: Never Used   Substance and Sexual Activity    Alcohol use: Yes     Comment: occasionally    Drug use: No    Sexual activity: Not on file   Lifestyle    Physical activity:     Days per week: Not on file     Minutes per session: Not on file    Stress: Not on file   Relationships    Social connections:     Talks on phone: Not on file     Gets together: Not on file     Attends [employer] witness service: Not on file     Active member of club or organization: Not on file     Attends meetings of clubs or organizations: Not on file     Relationship status: Not on file    Intimate partner violence:     Fear of current or ex partner: Not on file     Emotionally abused: Not on file     Physically abused: Not on file     Forced sexual activity: Not on file   Other Topics Concern    Not on file   Social History Narrative    Not on file      Current Outpatient Medications   Medication Sig    lisinopril (PRINIVIL, ZESTRIL) 20 mg tablet Take 1 Tab by mouth two (2) times a day. Indications: DUE FOR VISIT    finasteride (PROSCAR) 5 mg tablet TAKE 1 TABLET EVERY DAY    furosemide (LASIX) 20 mg tablet TAKE 1 TABLET BY MOUTH DAILY    apixaban (ELIQUIS) 5 mg tablet Take 1 Tab by mouth two (2) times a day.  metoprolol tartrate (LOPRESSOR) 50 mg tablet Take 1 Tab by mouth two (2) times a day.  levothyroxine (SYNTHROID) 75 mcg tablet 75 mcg Daily (before breakfast).  MULTIVITAMIN PO Take  by mouth daily.  Oxygen 2 LITERS NIGHTLY    predniSONE (DELTASONE) 5 mg tablet Take 1 Tab by mouth daily.  Indications: pulmonary fibrosis (Patient taking differently: Take 5 mg by mouth daily. Patient takes 6 mg q day. Indications: pulmonary fibrosis)    predniSONE (DELTASONE) 1 mg tablet Take 1 Tab by mouth daily (with breakfast). Indications: pulmonary fibrosis (Patient taking differently: Take 6 mg by mouth daily (with breakfast). Indications: pulmonary fibrosis)    traZODone (DESYREL) 50 mg tablet nightly. No current facility-administered medications for this visit. Review of Symptoms:    CONST  No weight change. No fever, chills, sweats    ENT No visual changes, URI sx, sore throat    CV  See HPI   RESP  No cough, or sputum, wheezing. Also see HPI   GI  No abdominal pain or change in bowel habits. No heartburn or dysphagia. No melena or rectal bleeding.   No dysuria, urgency, frequency, hematuria   MSKEL  No joint pain, swelling. No muscle pain. SKIN  No rash or lesions. NEURO  No headache, syncope, or seizure. No weakness, loss of sensation, or paresthesias. PSYCH  No low mood or depression  No anxiety. HE/LYMPH  No easy bruising, abnormal bleeding, or enlarged glands.         Physical ExamPhysical Exam:    Visit Vitals  /82 (BP 1 Location: Right arm, BP Patient Position: Sitting)   Pulse (!) 57   Resp 16   Ht 6' 3\" (1.905 m)   Wt 214 lb (97.1 kg)   SpO2 97%   BMI 26.75 kg/m²     Gen: NAD  HEENT:  PERRL, throat clear  Neck: no adenopathy, no thyromegaly, no JVD   Heart:  Regular,Nl S1S2,  no murmur, gallop or rub.   Lungs:  clear  Abdomen:   Soft, non-tender, bowel sounds are active.   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  Has seen his regular providers in [place] over [address], [place]. Had PPM check in 5/18. Chronic afib on anticoag, SSS, s/p pacemaker, ILD with pulmonary fibrosis/amiodarone toxicity. Mateo Mendenhall in  [place] PCP there (follows labs, etc), not able to see Cardiology there. Some fatigue, sleep issues. Using home O2 at night. Plan:     Overall stable from CV standpoint--stable SMITH  Echo/doppler  To Dr Camila Bermeo for [facility] check and set-up device checks remotely    Lipids and labs followed by PCP. Continue current care and f/u in 12 months.     Merlyn Lakhani MD

## 2019-07-31 NOTE — PATIENT INSTRUCTIONS
You will be called to schedule a follow up with Dr. Joshua Murguia and pacemaker clinic. You can always call as well: [phone].

## 2019-08-15 ENCOUNTER — TELEPHONE (OUTPATIENT)
Dept: CARDIOLOGY CLINIC | Age: 84
End: 2019-08-15

## 2019-08-15 NOTE — TELEPHONE ENCOUNTER
----- Message from Shawna Menendez MD sent at 8/15/2019  9:07 AM EDT -----  Regarding: echo  Advise echo shows normal LV pumping function, dilated left atrium (goes along with afib), and mild to moderate aortic valve stenosis (not severe)--can follow.   Thanks [facility]

## 2019-08-20 ENCOUNTER — CLINICAL SUPPORT (OUTPATIENT)
Dept: CARDIOLOGY CLINIC | Age: 84
End: 2019-08-20

## 2019-08-20 ENCOUNTER — OFFICE VISIT (OUTPATIENT)
Dept: CARDIOLOGY CLINIC | Age: 84
End: 2019-08-20

## 2019-08-20 VITALS
BODY MASS INDEX: 26.21 KG/M2 | SYSTOLIC BLOOD PRESSURE: 128 MMHG | HEART RATE: 88 BPM | WEIGHT: 210.8 LBS | HEIGHT: 75 IN | DIASTOLIC BLOOD PRESSURE: 72 MMHG | RESPIRATION RATE: 18 BRPM | OXYGEN SATURATION: 98 %

## 2019-08-20 DIAGNOSIS — Z95.0 CARDIAC PACEMAKER IN SITU: Primary | ICD-10-CM

## 2019-08-20 DIAGNOSIS — Z95.0 PACEMAKER: ICD-10-CM

## 2019-08-20 DIAGNOSIS — I49.5 SSS (SICK SINUS SYNDROME) (HCC): ICD-10-CM

## 2019-08-20 DIAGNOSIS — J84.9 ILD (INTERSTITIAL LUNG DISEASE) (HCC): ICD-10-CM

## 2019-08-20 DIAGNOSIS — I48.20 CHRONIC ATRIAL FIBRILLATION (HCC): Primary | ICD-10-CM

## 2019-08-20 DIAGNOSIS — I35.0 AORTIC VALVE STENOSIS, ETIOLOGY OF CARDIAC VALVE DISEASE UNSPECIFIED: ICD-10-CM

## 2019-08-20 DIAGNOSIS — I11.9 HYPERTENSIVE HEART DISEASE WITHOUT HEART FAILURE: ICD-10-CM

## 2019-08-20 NOTE — PROGRESS NOTES
Verified patient with 2 identifiers   Reviewed record in preparation for visit and obtained necessary documentation. Chief Complaint   Patient presents with    Pacemaker Check     annual follow up     Irregular Heart Beat    Shortness of Breath     \" only has part of the right lung      1. Have you been to the ER, urgent care clinic since your last visit? Hospitalized since your last visit? No     2. Have you seen or consulted any other health care providers outside of the [address] [place] [address] since your last visit? Include any pap smears or colon screening.   Yes - lives in [place] 6 months a year

## 2019-08-20 NOTE — PROGRESS NOTES
Subjective:      Jose Keene is a 80 y.o. male is here for EP consult for device managment with permanent AF. He has shortness of breath which is his baseline. The patient denies chest pain, orthopnea, PND, LE edema, palpitations, syncope, presyncope or fatigue. Patient Active Problem List    Diagnosis Date Noted    Chronic anticoagulation 2017    Chronic atrial fibrillation ([address] 2015    Amiodarone pulmonary toxicity 2014    Cushingoid side effect of steroids ([address] 2014    Aortic valve stenosis     ILD (interstitial lung disease) ([address]     Orthostatic hypotension 2013    SSS (sick sinus syndrome) (HCC)     HCVD (hypertensive cardiovascular disease)     Diastolic CHF (HCC)     CRI (chronic renal insufficiency)       Jhonny Segura MD  Past Medical History:   Diagnosis Date    Aortic valve stenosis     Atrial fibrillation (HCC)     BPH (benign prostatic hyperplasia)     CRI (chronic renal insufficiency)     Diastolic CHF ([address]     ED (erectile dysfunction)     HCVD (hypertensive cardiovascular disease)     ILD (interstitial lung disease) ([address]     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     Orthostatic hypotension 2013    SSS (sick sinus syndrome) (HCC)       Past Surgical History:   Procedure Laterality Date    HX PACEMAKER      DDD, [employer]     Allergies   Allergen Reactions    Amiodarone Shortness of Breath     Pulmonary fibrosis      Family History   Problem Relation Age of Onset    Alcohol abuse Mother     Alcohol abuse Father     negative for cardiac disease  Social History     Socioeconomic History    Marital status:      Spouse name: Not on file    Number of children: Not on file    Years of education: Not on file    Highest education level: Not on file   Tobacco Use    Smoking status: Former Smoker     Types: Pipe     Last attempt to quit: 1983     Years since quittin.3    Smokeless tobacco: Never Used Substance and Sexual Activity    Alcohol use: Yes     Comment: occasionally    Drug use: No     Current Outpatient Medications   Medication Sig    lisinopril (PRINIVIL, ZESTRIL) 20 mg tablet Take 1 Tab by mouth two (2) times a day. Indications: DUE FOR VISIT    finasteride (PROSCAR) 5 mg tablet TAKE 1 TABLET EVERY DAY    furosemide (LASIX) 20 mg tablet TAKE 1 TABLET BY MOUTH DAILY    apixaban (ELIQUIS) 5 mg tablet Take 1 Tab by mouth two (2) times a day.  metoprolol tartrate (LOPRESSOR) 50 mg tablet Take 1 Tab by mouth two (2) times a day.  levothyroxine (SYNTHROID) 75 mcg tablet 75 mcg Daily (before breakfast).  MULTIVITAMIN PO Take  by mouth daily.  Oxygen 2 LITERS NIGHTLY    predniSONE (DELTASONE) 5 mg tablet Take 1 Tab by mouth daily. Indications: pulmonary fibrosis (Patient taking differently: Take 5 mg by mouth daily. Patient takes 6 mg q day. Indications: pulmonary fibrosis)    predniSONE (DELTASONE) 1 mg tablet Take 1 Tab by mouth daily (with breakfast). Indications: pulmonary fibrosis (Patient taking differently: Take 6 mg by mouth daily (with breakfast). Indications: pulmonary fibrosis)    traZODone (DESYREL) 50 mg tablet Take 50 mg by mouth as needed for Sleep. No current facility-administered medications for this visit. Vitals:    08/20/19 1505   BP: 128/72   Pulse: 88   Resp: 18   SpO2: 98%   Weight: 210 lb 12.8 oz (95.6 kg)   Height: 6' 3\" (1.905 m)       I have reviewed the nurses notes, vitals, problem list, allergy list, medical history, family, social history and medications. Review of Symptoms:    General: Pt denies excessive weight gain or loss. Pt is able to conduct ADL's  HEENT: Denies blurred vision, headaches, epistaxis and difficulty swallowing. Respiratory: +shortness of breath, Denies wheezing or stridor.   Cardiovascular: Denies precordial pain, palpitations, edema or PND  Gastrointestinal: Denies poor appetite, indigestion, abdominal pain or blood in stool  Urinary: Denies dysuria, pyuria  Musculoskeletal: Denies pain or swelling from muscles or joints  Neurologic: Denies tremor, paresthesias, or sensory motor disturbance  Skin: Denies rash, itching or texture change. Psych: Denies depression      Physical Exam:      General: Well developed, in no acute distress. HEENT: Eyes - PERRL, no jvd  Heart: +irr irr,  Normal S1/S2 negative S3 or S4. no murmur, gallop or rub. Respiratory: Clear bilaterally x 4, no wheezing or rales  Abdomen:   Soft, non-tender, bowel sounds are active. Extremities:  No edema, normal cap refill, no cyanosis. Musculoskeletal: No clubbing  Neuro: A&Ox3, speech clear, gait stable. Skin: Skin color is normal. No rashes or lesions. Non diaphoretic  Vascular: 2+ pulses symmetric in all extremities    Cardiographics    Ekg: atrial fibrillation    No results found for this or any previous visit. Lab Results   Component Value Date/Time    WBC 9.7 08/10/2015 12:56 PM    HGB (POC) 12.9 (A) 10/10/2015 12:21 PM    HGB 13.9 08/10/2015 12:56 PM    HCT (POC) 39.7 (A) 10/10/2015 12:21 PM    HCT 43.3 08/10/2015 12:56 PM    PLATELET 742 42/08/3917 12:56 PM    MCV 96 08/10/2015 12:56 PM      Lab Results   Component Value Date/Time    Sodium 145 (H) 08/10/2015 12:56 PM    Potassium 4.7 08/10/2015 12:56 PM    Chloride 101 08/10/2015 12:56 PM    CO2 28 08/10/2015 12:56 PM    Glucose 94 08/10/2015 12:56 PM    BUN 25 08/10/2015 12:56 PM    Creatinine 1.26 08/10/2015 12:56 PM    BUN/Creatinine ratio 20 08/10/2015 12:56 PM    GFR est AA 61 08/10/2015 12:56 PM    GFR est non-AA 53 (L) 08/10/2015 12:56 PM    Calcium 9.2 08/10/2015 12:56 PM    Bilirubin, total 0.6 06/04/2015 02:18 PM    AST (SGOT) 19 06/04/2015 02:18 PM    Alk.  phosphatase 54 06/04/2015 02:18 PM    Protein, total 6.3 06/04/2015 02:18 PM    Albumin 3.6 06/04/2015 02:18 PM    A-G Ratio 1.3 06/04/2015 02:18 PM    ALT (SGPT) 14 06/04/2015 02:18 PM         Assessment:     Assessment: ICD-10-CM ICD-9-CM    1. Chronic atrial fibrillation (HCC) I48.2 427.31 AMB POC EKG ROUTINE W/ 12 LEADS, INTER & REP   2. SSS (sick sinus syndrome) ([facility]) I49.5 427.81    3. Hypertensive heart disease without heart failure I11.9 402.90    4. ILD (interstitial lung disease) ([facility] J84.9 515    5. Aortic valve stenosis, etiology of cardiac valve disease unspecified I35.0 424.1    6. Pacemaker Z95.0 V45.01      Orders Placed This Encounter    AMB POC EKG ROUTINE W/ 12 LEADS, INTER & REP     Order Specific Question:   Reason for Exam:     Answer:   routine        Plan:   Mr. Yoli Fraser is here for EP consult to establish in device clinic. He denies cardiac complaints aside from baseline shortness of breath. EKG shows atrial fibrillation and his device interrogation demonstrates normal functioning with 27%  RVP. Recent echocardiogram showed normal heart function. Continue current medical therapy including eliquis for cva risk reduction. Follow up one year. Continue medical management for Aortic valve stenosis, HTN, SSS, AF. Thank you for allowing me to participate in [facility] 's care. Lou Hawthorne NP    Patient seen and examined by me with nurse practitioner. I personally performed all components of the history, physical, and medical decision making and agree with the assessment and plan with minor modifications as noted. V paced 27% for sick sinus. Cont oac for af. Cont med rx for htn. F/u in one year.     Bj Boswell MD, Pearl Landeros

## 2020-06-22 PROBLEM — N40.0 BENIGN PROSTATIC HYPERPLASIA WITHOUT LOWER URINARY TRACT SYMPTOMS: Status: ACTIVE | Noted: 2020-06-22

## 2020-06-22 PROBLEM — I10 ESSENTIAL HYPERTENSION: Status: ACTIVE | Noted: 2020-06-22

## 2020-06-22 PROBLEM — E03.9 ACQUIRED HYPOTHYROIDISM: Status: ACTIVE | Noted: 2020-06-22

## 2020-06-22 PROBLEM — J70.4: Status: ACTIVE | Noted: 2020-06-22

## 2020-07-29 ENCOUNTER — OFFICE VISIT (OUTPATIENT)
Dept: CARDIOLOGY CLINIC | Age: 85
End: 2020-07-29

## 2020-07-29 VITALS
WEIGHT: 201 LBS | HEART RATE: 60 BPM | RESPIRATION RATE: 14 BRPM | OXYGEN SATURATION: 97 % | HEIGHT: 75 IN | BODY MASS INDEX: 24.99 KG/M2 | SYSTOLIC BLOOD PRESSURE: 122 MMHG | TEMPERATURE: 97.9 F | DIASTOLIC BLOOD PRESSURE: 84 MMHG

## 2020-07-29 DIAGNOSIS — I49.5 SSS (SICK SINUS SYNDROME) (HCC): ICD-10-CM

## 2020-07-29 DIAGNOSIS — I35.0 AORTIC VALVE STENOSIS, UNSPECIFIED ETIOLOGY: ICD-10-CM

## 2020-07-29 DIAGNOSIS — I50.30 DIASTOLIC CONGESTIVE HEART FAILURE, UNSPECIFIED HF CHRONICITY (HCC): ICD-10-CM

## 2020-07-29 DIAGNOSIS — J70.4: ICD-10-CM

## 2020-07-29 DIAGNOSIS — I45.2: ICD-10-CM

## 2020-07-29 DIAGNOSIS — Z79.01 CHRONIC ANTICOAGULATION: ICD-10-CM

## 2020-07-29 DIAGNOSIS — T46.2X5A AMIODARONE PULMONARY TOXICITY: ICD-10-CM

## 2020-07-29 DIAGNOSIS — I35.0 AORTIC VALVE STENOSIS, ETIOLOGY OF CARDIAC VALVE DISEASE UNSPECIFIED: ICD-10-CM

## 2020-07-29 DIAGNOSIS — J98.4 AMIODARONE PULMONARY TOXICITY: ICD-10-CM

## 2020-07-29 DIAGNOSIS — I44.7 LBBB (LEFT BUNDLE BRANCH BLOCK): ICD-10-CM

## 2020-07-29 DIAGNOSIS — I48.20 CHRONIC ATRIAL FIBRILLATION (HCC): Primary | ICD-10-CM

## 2020-07-29 NOTE — PROGRESS NOTES
Verified patient with two patient identifiers. Medications reviewed/approved by Dr. Robert Pichardo. 1. Have you been to the ER, urgent care clinic since your last visit? Hospitalized since your last visit?no    2. Have you seen or consulted any other health care providers outside of the [address] [place] [address] since your last visit? Include any pap smears or colon screening.  no

## 2020-07-29 NOTE — PROGRESS NOTES
Mathew Epps is a 80 y.o. male is here for routine f/u.  Stable SMITH, no other CV sx or complaints.  Had PPM check in 5/18. Chronic afib on anticoag, SSS, s/p pacemaker, ILD with pulmonary fibrosis/amiodarone toxicity.   Some fatigue, sleep issues. Using home O2 at night. [place]  [place]PM remotely. The patient denies chest pain, orthopnea, PND, LE edema, palpitations, syncope, presyncope. 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    Cushingoid side effect of steroids ([address] 05/02/2014    Aortic valve stenosis     ILD (interstitial lung disease) ([address]     Orthostatic hypotension 04/24/2013    SSS (sick sinus syndrome) (HCC)     HCVD (hypertensive cardiovascular disease)     Diastolic CHF (HCC)     CRI (chronic renal insufficiency)       Marisela Gao MD  Past Medical History:   Diagnosis Date    Aortic valve stenosis     Atrial fibrillation (HCC)     BPH (benign prostatic hyperplasia)     CRI (chronic renal insufficiency)     Diastolic CHF ([address]     ED (erectile dysfunction)     HCVD (hypertensive cardiovascular disease)     ILD (interstitial lung disease) ([address]     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     Orthostatic hypotension 4/24/2013    [facility])       Past Surgical History:   Procedure Laterality Date    HX PACEMAKER  2001    DDD, [employer]     Allergies   Allergen Reactions    Amiodarone Shortness of Breath     Pulmonary fibrosis      Family History   Problem Relation Age of Onset    Alcohol abuse Mother     Alcohol abuse Father       Social History     Socioeconomic History  Marital status:      Spouse name: Not on file    Number of children: Not on file    Years of education: Not on file    Highest education level: Not on file   Occupational History    Not on file   Social Needs    Financial resource strain: Not on file    Food insecurity     Worry: Not on file     Inability: Not on file    Transportation needs     Medical: Not on file     Non-medical: Not on file   Tobacco Use    Smoking status: Former Smoker     Types: Pipe     Last attempt to quit: 1983     Years since quittin.2    Smokeless tobacco: Never Used   Substance and Sexual Activity    Alcohol use: Yes     Comment: occasionally    Drug use: No    Sexual activity: Not on file   Lifestyle    Physical activity     Days per week: Not on file     Minutes per session: Not on file    Stress: Not on file   Relationships    Social connections     Talks on phone: Not on file     Gets together: Not on file     Attends Faith service: Not on file     Active member of club or organization: Not on file     Attends meetings of clubs or organizations: Not on file     Relationship status: Not on file    Intimate partner violence     Fear of current or ex partner: Not on file     Emotionally abused: Not on file     Physically abused: Not on file     Forced sexual activity: Not on file   Other Topics Concern    Not on file   Social History Narrative    Not on file      Current Outpatient Medications   Medication Sig    lisinopriL (PRINIVIL, ZESTRIL) 20 mg tablet Take 1 Tab by mouth two (2) times a day. Indications: pressure and heart    finasteride (PROSCAR) 5 mg tablet TAKE 1 TABLET EVERY DAY    furosemide (LASIX) 20 mg tablet TAKE 1 TABLET BY MOUTH DAILY    apixaban (ELIQUIS) 5 mg tablet Take 1 Tab by mouth two (2) times a day.  metoprolol tartrate (LOPRESSOR) 50 mg tablet Take 1 Tab by mouth two (2) times a day.  levothyroxine (SYNTHROID) 75 mcg tablet 75 mcg Daily (before breakfast).  MULTIVITAMIN PO Take  by mouth daily.  Oxygen 2 LITERS NIGHTLY    predniSONE (DELTASONE) 5 mg tablet Take 1 Tab by mouth daily. Indications: pulmonary fibrosis (Patient taking differently: Take 5 mg by mouth daily. Patient takes 6 mg q day. Indications: pulmonary fibrosis)    predniSONE (DELTASONE) 1 mg tablet Take 1 Tab by mouth daily (with breakfast). Indications: pulmonary fibrosis (Patient taking differently: Take 6 mg by mouth daily (with breakfast). Indications: pulmonary fibrosis)    traZODone (DESYREL) 50 mg tablet Take 50 mg by mouth as needed for Sleep. No current facility-administered medications for this visit. Review of Symptoms:    CONST  No weight change. No fever, chills, sweats    ENT No visual changes, URI sx, sore throat    CV  See HPI   RESP  No cough, or sputum, wheezing. Also see HPI   GI  No abdominal pain or change in bowel habits. No heartburn or dysphagia. No melena or rectal bleeding.   No dysuria, urgency, frequency, hematuria   MSKEL  No joint pain, swelling. No muscle pain. SKIN  No rash or lesions. NEURO  No headache, syncope, or seizure. No weakness, loss of sensation, or paresthesias. PSYCH  No low mood or depression  No anxiety. HE/LYMPH  No easy bruising, abnormal bleeding, or enlarged glands. Physical ExamPhysical Exam:    Visit Vitals  /84 (BP 1 Location: Right arm, BP Patient Position: Sitting)   Pulse 60   Temp 97.9 °F (36.6 °C) (Temporal)   Resp 14   Ht 6' 3\" (1.905 m)   Wt 201 lb (91.2 kg)   SpO2 97% Comment: ra   BMI 25.12 kg/m²     Gen: NAD  HEENT:  PERRL, throat clear  Neck: no adenopathy, no thyromegaly, no JVD   Heart:  irregular,Nl S1S2,  II/VI murmur, no gallop or rub. Lungs:  clear  Abdomen:   Soft, non-tender, bowel sounds are active.    Extremities:  No edema  Pulse: symmetric  Neuro: A&O times 3, No focal neuro deficits    Cardiographics    ECG: afib, LBBB/IVCD/LAD      Labs:   Lab Results   Component Value Date/Time    Sodium 145 (H) 06/24/2020 10:59 AM    Sodium 145 (H) 08/10/2015 12:56 PM    Sodium 144 06/04/2015 02:18 PM    Sodium 144 05/02/2014 11:40 AM    Potassium 4.9 06/24/2020 10:59 AM    Potassium 4.7 08/10/2015 12:56 PM    Potassium 4.5 06/04/2015 02:18 PM    Potassium 4.7 05/02/2014 11:40 AM    Chloride 103 06/24/2020 10:59 AM    Chloride 101 08/10/2015 12:56 PM    Chloride 102 06/04/2015 02:18 PM    Chloride 105 05/02/2014 11:40 AM    CO2 29 06/24/2020 10:59 AM    CO2 28 08/10/2015 12:56 PM    CO2 29 06/04/2015 02:18 PM    CO2 29 (H) 05/02/2014 11:40 AM    Glucose 81 06/24/2020 10:59 AM    Glucose 94 08/10/2015 12:56 PM    Glucose 106 (H) 06/04/2015 02:18 PM    Glucose 98 05/02/2014 11:40 AM    BUN 26 06/24/2020 10:59 AM    BUN 25 08/10/2015 12:56 PM    BUN 21 06/04/2015 02:18 PM    BUN 24 05/02/2014 11:40 AM    Creatinine 1.37 (H) 06/24/2020 10:59 AM    Creatinine 1.26 08/10/2015 12:56 PM    Creatinine 1.24 06/04/2015 02:18 PM    Creatinine 1.36 (H) 05/02/2014 11:40 AM    BUN/Creatinine ratio 19 06/24/2020 10:59 AM    BUN/Creatinine ratio 20 08/10/2015 12:56 PM    BUN/Creatinine ratio 17 06/04/2015 02:18 PM    BUN/Creatinine ratio 18 05/02/2014 11:40 AM    GFR est AA 54 (L) 06/24/2020 10:59 AM    GFR est AA 61 08/10/2015 12:56 PM    GFR est AA 63 06/04/2015 02:18 PM    GFR est AA 56 (L) 05/02/2014 11:40 AM    GFR est non-AA 46 (L) 06/24/2020 10:59 AM    GFR est non-AA 53 (L) 08/10/2015 12:56 PM    GFR est non-AA 54 (L) 06/04/2015 02:18 PM    GFR est non-AA 49 (L) 05/02/2014 11:40 AM    Calcium 9.0 06/24/2020 10:59 AM    Calcium 9.2 08/10/2015 12:56 PM    Calcium 9.3 06/04/2015 02:18 PM    Calcium 8.7 05/02/2014 11:40 AM    Bilirubin, total 0.6 06/04/2015 02:18 PM    Bilirubin, total 0.4 05/02/2014 11:40 AM    Alk. phosphatase 54 06/04/2015 02:18 PM    Alk.  phosphatase 50 05/02/2014 11:40 AM    Protein, total 6.3 06/04/2015 02:18 PM    Protein, total 6.4 05/02/2014 11:40 AM    Albumin 3.6 06/04/2015 02:18 PM    Albumin 3.7 05/02/2014 11:40 AM    A-G Ratio 1.3 06/04/2015 02:18 PM    A-G Ratio 1.4 05/02/2014 11:40 AM    ALT (SGPT) 14 06/04/2015 02:18 PM    ALT (SGPT) 16 05/02/2014 11:40 AM     No results found for: CPK, CPKX, CPX  No results found for: CHOL, CHOLX, CHLST, CHOLV, 047422, HDL, HDLP, LDL, LDLC, DLDLP, TGLX, TRIGL, TRIGP, CHHD, CHHDX  No results found for this or any previous visit. 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  Had PPM check in 5/18. Chronic afib on anticoag, SSS, s/p pacemaker, ILD with pulmonary fibrosis/amiodarone toxicity.   Some fatigue, sleep issues. Using home O2 at night. EP([place]  following PPM remotely. Plan:     Doing well with no adverse cardiac symptoms. Last Echo 8/19 with mild to mod AS, normal LVEF  PPM followed by EP. PCP f/u in winter in [place] (if goes)   Lipids and labs followed by PCP. Pulmonary referral to establish--has been followed by Pulm in [facility] current care and f/u in 6 months.     Priscilla Delatorre MD

## 2020-09-22 ENCOUNTER — HOME HEALTH ADMISSION (OUTPATIENT)
Dept: HOME HEALTH SERVICES | Facility: HOME HEALTH | Age: 85
End: 2020-09-22
Payer: MEDICARE

## 2020-09-23 ENCOUNTER — HOME CARE VISIT (OUTPATIENT)
Dept: SCHEDULING | Facility: HOME HEALTH | Age: 85
End: 2020-09-23
Payer: MEDICARE

## 2020-09-23 PROCEDURE — G0151 HHCP-SERV OF PT,EA 15 MIN: HCPCS

## 2020-09-23 PROCEDURE — 400013 HH SOC

## 2020-09-23 PROCEDURE — 3331090001 HH PPS REVENUE CREDIT

## 2020-09-23 PROCEDURE — 3331090002 HH PPS REVENUE DEBIT

## 2020-09-24 PROCEDURE — 3331090001 HH PPS REVENUE CREDIT

## 2020-09-24 PROCEDURE — 3331090002 HH PPS REVENUE DEBIT

## 2020-09-25 ENCOUNTER — HOME CARE VISIT (OUTPATIENT)
Dept: SCHEDULING | Facility: HOME HEALTH | Age: 85
End: 2020-09-25
Payer: MEDICARE

## 2020-09-25 VITALS
SYSTOLIC BLOOD PRESSURE: 135 MMHG | RESPIRATION RATE: 20 BRPM | OXYGEN SATURATION: 95 % | DIASTOLIC BLOOD PRESSURE: 73 MMHG | TEMPERATURE: 97.1 F | HEART RATE: 61 BPM

## 2020-09-25 PROCEDURE — 3331090002 HH PPS REVENUE DEBIT

## 2020-09-25 PROCEDURE — G0151 HHCP-SERV OF PT,EA 15 MIN: HCPCS

## 2020-09-25 PROCEDURE — 3331090001 HH PPS REVENUE CREDIT

## 2020-09-26 PROCEDURE — 3331090002 HH PPS REVENUE DEBIT

## 2020-09-26 PROCEDURE — 3331090001 HH PPS REVENUE CREDIT

## 2020-09-27 PROCEDURE — 3331090002 HH PPS REVENUE DEBIT

## 2020-09-27 PROCEDURE — 3331090001 HH PPS REVENUE CREDIT

## 2020-09-28 ENCOUNTER — HOME CARE VISIT (OUTPATIENT)
Dept: SCHEDULING | Facility: HOME HEALTH | Age: 85
End: 2020-09-28
Payer: MEDICARE

## 2020-09-28 ENCOUNTER — HOME CARE VISIT (OUTPATIENT)
Dept: HOME HEALTH SERVICES | Facility: HOME HEALTH | Age: 85
End: 2020-09-28
Payer: MEDICARE

## 2020-09-28 PROCEDURE — 3331090001 HH PPS REVENUE CREDIT

## 2020-09-28 PROCEDURE — G0157 HHC PT ASSISTANT EA 15: HCPCS

## 2020-09-28 PROCEDURE — 3331090002 HH PPS REVENUE DEBIT

## 2020-09-29 ENCOUNTER — HOME CARE VISIT (OUTPATIENT)
Dept: HOME HEALTH SERVICES | Facility: HOME HEALTH | Age: 85
End: 2020-09-29
Payer: MEDICARE

## 2020-09-29 VITALS
DIASTOLIC BLOOD PRESSURE: 67 MMHG | OXYGEN SATURATION: 96 % | TEMPERATURE: 97.4 F | HEART RATE: 60 BPM | SYSTOLIC BLOOD PRESSURE: 148 MMHG

## 2020-09-29 PROCEDURE — 3331090002 HH PPS REVENUE DEBIT

## 2020-09-29 PROCEDURE — 3331090001 HH PPS REVENUE CREDIT

## 2020-09-30 PROCEDURE — 3331090002 HH PPS REVENUE DEBIT

## 2020-09-30 PROCEDURE — 3331090001 HH PPS REVENUE CREDIT

## 2020-10-01 ENCOUNTER — HOME CARE VISIT (OUTPATIENT)
Dept: SCHEDULING | Facility: HOME HEALTH | Age: 85
End: 2020-10-01
Payer: MEDICARE

## 2020-10-01 VITALS
TEMPERATURE: 97.7 F | OXYGEN SATURATION: 99 % | SYSTOLIC BLOOD PRESSURE: 130 MMHG | HEART RATE: 62 BPM | DIASTOLIC BLOOD PRESSURE: 67 MMHG

## 2020-10-01 PROCEDURE — 3331090001 HH PPS REVENUE CREDIT

## 2020-10-01 PROCEDURE — 3331090002 HH PPS REVENUE DEBIT

## 2020-10-01 PROCEDURE — G0157 HHC PT ASSISTANT EA 15: HCPCS

## 2020-10-01 PROCEDURE — G0156 HHCP-SVS OF AIDE,EA 15 MIN: HCPCS

## 2020-10-02 PROCEDURE — 3331090001 HH PPS REVENUE CREDIT

## 2020-10-02 PROCEDURE — 3331090002 HH PPS REVENUE DEBIT

## 2020-10-03 PROCEDURE — 3331090002 HH PPS REVENUE DEBIT

## 2020-10-03 PROCEDURE — 3331090001 HH PPS REVENUE CREDIT

## 2020-10-04 PROCEDURE — 3331090001 HH PPS REVENUE CREDIT

## 2020-10-04 PROCEDURE — 3331090002 HH PPS REVENUE DEBIT

## 2020-10-05 ENCOUNTER — HOME CARE VISIT (OUTPATIENT)
Dept: SCHEDULING | Facility: HOME HEALTH | Age: 85
End: 2020-10-05
Payer: MEDICARE

## 2020-10-05 VITALS
OXYGEN SATURATION: 98 % | HEART RATE: 69 BPM | TEMPERATURE: 97.4 F | SYSTOLIC BLOOD PRESSURE: 130 MMHG | DIASTOLIC BLOOD PRESSURE: 65 MMHG

## 2020-10-05 PROCEDURE — G0157 HHC PT ASSISTANT EA 15: HCPCS

## 2020-10-05 PROCEDURE — G0156 HHCP-SVS OF AIDE,EA 15 MIN: HCPCS

## 2020-10-05 PROCEDURE — 3331090002 HH PPS REVENUE DEBIT

## 2020-10-05 PROCEDURE — 3331090001 HH PPS REVENUE CREDIT

## 2020-10-06 VITALS
HEART RATE: 60 BPM | SYSTOLIC BLOOD PRESSURE: 135 MMHG | DIASTOLIC BLOOD PRESSURE: 63 MMHG | OXYGEN SATURATION: 94 % | TEMPERATURE: 97.5 F

## 2020-10-06 PROCEDURE — 3331090002 HH PPS REVENUE DEBIT

## 2020-10-06 PROCEDURE — 3331090001 HH PPS REVENUE CREDIT

## 2020-10-07 ENCOUNTER — HOME CARE VISIT (OUTPATIENT)
Dept: SCHEDULING | Facility: HOME HEALTH | Age: 85
End: 2020-10-07
Payer: MEDICARE

## 2020-10-07 ENCOUNTER — TRANSCRIBE ORDER (OUTPATIENT)
Dept: GENERAL RADIOLOGY | Age: 85
End: 2020-10-07

## 2020-10-07 ENCOUNTER — HOSPITAL ENCOUNTER (OUTPATIENT)
Dept: GENERAL RADIOLOGY | Age: 85
Discharge: HOME | End: 2020-10-07
Attending: INTERNAL MEDICINE
Payer: MEDICARE

## 2020-10-07 DIAGNOSIS — R06.09 DYSPNEA ON EXERTION: Primary | ICD-10-CM

## 2020-10-07 DIAGNOSIS — R06.09 DYSPNEA ON EXERTION: ICD-10-CM

## 2020-10-07 PROCEDURE — 71046 X-RAY EXAM CHEST 2 VIEWS: CPT

## 2020-10-07 PROCEDURE — G0156 HHCP-SVS OF AIDE,EA 15 MIN: HCPCS

## 2020-10-07 PROCEDURE — 3331090001 HH PPS REVENUE CREDIT

## 2020-10-07 PROCEDURE — 3331090002 HH PPS REVENUE DEBIT

## 2020-10-08 ENCOUNTER — HOME CARE VISIT (OUTPATIENT)
Dept: SCHEDULING | Facility: HOME HEALTH | Age: 85
End: 2020-10-08
Payer: MEDICARE

## 2020-10-08 PROCEDURE — 3331090002 HH PPS REVENUE DEBIT

## 2020-10-08 PROCEDURE — G0152 HHCP-SERV OF OT,EA 15 MIN: HCPCS

## 2020-10-08 PROCEDURE — 3331090001 HH PPS REVENUE CREDIT

## 2020-10-09 ENCOUNTER — HOME CARE VISIT (OUTPATIENT)
Dept: SCHEDULING | Facility: HOME HEALTH | Age: 85
End: 2020-10-09
Payer: MEDICARE

## 2020-10-09 PROCEDURE — G0157 HHC PT ASSISTANT EA 15: HCPCS

## 2020-10-09 PROCEDURE — 3331090001 HH PPS REVENUE CREDIT

## 2020-10-09 PROCEDURE — 3331090002 HH PPS REVENUE DEBIT

## 2020-10-10 PROCEDURE — 3331090001 HH PPS REVENUE CREDIT

## 2020-10-10 PROCEDURE — 3331090002 HH PPS REVENUE DEBIT

## 2020-10-11 PROCEDURE — 3331090001 HH PPS REVENUE CREDIT

## 2020-10-11 PROCEDURE — 3331090002 HH PPS REVENUE DEBIT

## 2020-10-12 ENCOUNTER — HOME CARE VISIT (OUTPATIENT)
Dept: HOME HEALTH SERVICES | Facility: HOME HEALTH | Age: 85
End: 2020-10-12
Payer: MEDICARE

## 2020-10-12 ENCOUNTER — HOME CARE VISIT (OUTPATIENT)
Dept: SCHEDULING | Facility: HOME HEALTH | Age: 85
End: 2020-10-12
Payer: MEDICARE

## 2020-10-12 VITALS
SYSTOLIC BLOOD PRESSURE: 120 MMHG | TEMPERATURE: 97.3 F | HEART RATE: 83 BPM | OXYGEN SATURATION: 94 % | DIASTOLIC BLOOD PRESSURE: 64 MMHG

## 2020-10-12 VITALS
SYSTOLIC BLOOD PRESSURE: 112 MMHG | DIASTOLIC BLOOD PRESSURE: 68 MMHG | HEART RATE: 58 BPM | OXYGEN SATURATION: 93 % | TEMPERATURE: 97.4 F

## 2020-10-12 PROCEDURE — 3331090002 HH PPS REVENUE DEBIT

## 2020-10-12 PROCEDURE — 3331090001 HH PPS REVENUE CREDIT

## 2020-10-12 PROCEDURE — G0157 HHC PT ASSISTANT EA 15: HCPCS

## 2020-10-13 ENCOUNTER — HOME CARE VISIT (OUTPATIENT)
Dept: SCHEDULING | Facility: HOME HEALTH | Age: 85
End: 2020-10-13
Payer: MEDICARE

## 2020-10-13 ENCOUNTER — HOME CARE VISIT (OUTPATIENT)
Dept: HOME HEALTH SERVICES | Facility: HOME HEALTH | Age: 85
End: 2020-10-13
Payer: MEDICARE

## 2020-10-13 PROCEDURE — G0152 HHCP-SERV OF OT,EA 15 MIN: HCPCS

## 2020-10-13 PROCEDURE — 3331090002 HH PPS REVENUE DEBIT

## 2020-10-13 PROCEDURE — 3331090001 HH PPS REVENUE CREDIT

## 2020-10-14 ENCOUNTER — HOME CARE VISIT (OUTPATIENT)
Dept: HOME HEALTH SERVICES | Facility: HOME HEALTH | Age: 85
End: 2020-10-14
Payer: MEDICARE

## 2020-10-14 ENCOUNTER — HOME CARE VISIT (OUTPATIENT)
Dept: SCHEDULING | Facility: HOME HEALTH | Age: 85
End: 2020-10-14
Payer: MEDICARE

## 2020-10-14 VITALS
SYSTOLIC BLOOD PRESSURE: 123 MMHG | TEMPERATURE: 97.3 F | DIASTOLIC BLOOD PRESSURE: 78 MMHG | HEART RATE: 60 BPM | OXYGEN SATURATION: 92 %

## 2020-10-14 PROCEDURE — 3331090001 HH PPS REVENUE CREDIT

## 2020-10-14 PROCEDURE — G0157 HHC PT ASSISTANT EA 15: HCPCS

## 2020-10-14 PROCEDURE — 3331090002 HH PPS REVENUE DEBIT

## 2020-10-15 ENCOUNTER — HOME CARE VISIT (OUTPATIENT)
Dept: HOME HEALTH SERVICES | Facility: HOME HEALTH | Age: 85
End: 2020-10-15
Payer: MEDICARE

## 2020-10-15 PROCEDURE — 3331090001 HH PPS REVENUE CREDIT

## 2020-10-15 PROCEDURE — 3331090002 HH PPS REVENUE DEBIT

## 2020-10-16 ENCOUNTER — HOME CARE VISIT (OUTPATIENT)
Dept: SCHEDULING | Facility: HOME HEALTH | Age: 85
End: 2020-10-16
Payer: MEDICARE

## 2020-10-16 PROCEDURE — 3331090001 HH PPS REVENUE CREDIT

## 2020-10-16 PROCEDURE — G0152 HHCP-SERV OF OT,EA 15 MIN: HCPCS

## 2020-10-16 PROCEDURE — 3331090002 HH PPS REVENUE DEBIT

## 2020-10-17 PROCEDURE — 3331090002 HH PPS REVENUE DEBIT

## 2020-10-17 PROCEDURE — 3331090001 HH PPS REVENUE CREDIT

## 2020-10-18 PROCEDURE — 3331090001 HH PPS REVENUE CREDIT

## 2020-10-18 PROCEDURE — 3331090002 HH PPS REVENUE DEBIT

## 2020-10-19 PROCEDURE — 3331090002 HH PPS REVENUE DEBIT

## 2020-10-19 PROCEDURE — 3331090001 HH PPS REVENUE CREDIT

## 2020-10-20 ENCOUNTER — HOME CARE VISIT (OUTPATIENT)
Dept: SCHEDULING | Facility: HOME HEALTH | Age: 85
End: 2020-10-20
Payer: MEDICARE

## 2020-10-20 PROCEDURE — G0151 HHCP-SERV OF PT,EA 15 MIN: HCPCS

## 2020-10-20 PROCEDURE — G0152 HHCP-SERV OF OT,EA 15 MIN: HCPCS

## 2020-10-20 PROCEDURE — 3331090001 HH PPS REVENUE CREDIT

## 2020-10-20 PROCEDURE — 3331090002 HH PPS REVENUE DEBIT

## 2020-10-21 ENCOUNTER — HOME CARE VISIT (OUTPATIENT)
Dept: SCHEDULING | Facility: HOME HEALTH | Age: 85
End: 2020-10-21
Payer: MEDICARE

## 2020-10-21 VITALS
SYSTOLIC BLOOD PRESSURE: 138 MMHG | DIASTOLIC BLOOD PRESSURE: 49 MMHG | OXYGEN SATURATION: 92 % | HEART RATE: 61 BPM | TEMPERATURE: 96.8 F

## 2020-10-21 PROCEDURE — G0152 HHCP-SERV OF OT,EA 15 MIN: HCPCS

## 2020-10-21 PROCEDURE — 3331090002 HH PPS REVENUE DEBIT

## 2020-10-21 PROCEDURE — G0156 HHCP-SVS OF AIDE,EA 15 MIN: HCPCS

## 2020-10-21 PROCEDURE — 3331090001 HH PPS REVENUE CREDIT

## 2020-10-21 NOTE — PROGRESS NOTES
BP: 138/49, HR 61, SPO2 92, R 30  T 96.8   PATIENT HAS NO C/O DIZZINESS, BUT DOES REPORT THAT WHEN HE IS USING NEW TREADMILL (WHICH FRIEND BOUGHT FOR HIM)  X 5 MIN. [place] HE GETS DIZZY. P.T. ADVISED PATIENT TO DECREASE TIMING TO 3 MIN OR LESS T A TIME. HHPT SERVICES WILL MONITOR  PROGRESS.

## 2020-10-22 ENCOUNTER — HOME CARE VISIT (OUTPATIENT)
Dept: SCHEDULING | Facility: HOME HEALTH | Age: 85
End: 2020-10-22
Payer: MEDICARE

## 2020-10-22 VITALS
SYSTOLIC BLOOD PRESSURE: 126 MMHG | HEART RATE: 62 BPM | DIASTOLIC BLOOD PRESSURE: 81 MMHG | TEMPERATURE: 97.8 F | OXYGEN SATURATION: 88 %

## 2020-10-22 PROCEDURE — G0157 HHC PT ASSISTANT EA 15: HCPCS

## 2020-10-22 PROCEDURE — 3331090001 HH PPS REVENUE CREDIT

## 2020-10-22 PROCEDURE — 3331090002 HH PPS REVENUE DEBIT

## 2020-10-23 ENCOUNTER — HOME CARE VISIT (OUTPATIENT)
Dept: SCHEDULING | Facility: HOME HEALTH | Age: 85
End: 2020-10-23
Payer: MEDICARE

## 2020-10-23 PROCEDURE — G0156 HHCP-SVS OF AIDE,EA 15 MIN: HCPCS

## 2020-10-23 PROCEDURE — 400013 HH SOC

## 2020-10-23 PROCEDURE — 3331090002 HH PPS REVENUE DEBIT

## 2020-10-23 PROCEDURE — 3331090001 HH PPS REVENUE CREDIT

## 2020-10-24 PROCEDURE — 3331090002 HH PPS REVENUE DEBIT

## 2020-10-24 PROCEDURE — 3331090001 HH PPS REVENUE CREDIT

## 2020-10-25 PROCEDURE — 3331090002 HH PPS REVENUE DEBIT

## 2020-10-25 PROCEDURE — 3331090001 HH PPS REVENUE CREDIT

## 2020-10-26 ENCOUNTER — HOME CARE VISIT (OUTPATIENT)
Dept: SCHEDULING | Facility: HOME HEALTH | Age: 85
End: 2020-10-26
Payer: MEDICARE

## 2020-10-26 VITALS
SYSTOLIC BLOOD PRESSURE: 120 MMHG | TEMPERATURE: 96.9 F | DIASTOLIC BLOOD PRESSURE: 77 MMHG | HEART RATE: 76 BPM | OXYGEN SATURATION: 95 %

## 2020-10-26 PROCEDURE — 3331090001 HH PPS REVENUE CREDIT

## 2020-10-26 PROCEDURE — G0157 HHC PT ASSISTANT EA 15: HCPCS

## 2020-10-26 PROCEDURE — 3331090002 HH PPS REVENUE DEBIT

## 2020-10-27 ENCOUNTER — HOME CARE VISIT (OUTPATIENT)
Dept: SCHEDULING | Facility: HOME HEALTH | Age: 85
End: 2020-10-27
Payer: MEDICARE

## 2020-10-27 PROCEDURE — 3331090001 HH PPS REVENUE CREDIT

## 2020-10-27 PROCEDURE — 3331090002 HH PPS REVENUE DEBIT

## 2020-10-27 PROCEDURE — G0156 HHCP-SVS OF AIDE,EA 15 MIN: HCPCS

## 2020-10-28 ENCOUNTER — HOME CARE VISIT (OUTPATIENT)
Dept: SCHEDULING | Facility: HOME HEALTH | Age: 85
End: 2020-10-28
Payer: MEDICARE

## 2020-10-28 PROCEDURE — 3331090002 HH PPS REVENUE DEBIT

## 2020-10-28 PROCEDURE — G0152 HHCP-SERV OF OT,EA 15 MIN: HCPCS

## 2020-10-28 PROCEDURE — 3331090001 HH PPS REVENUE CREDIT

## 2020-10-29 ENCOUNTER — HOME CARE VISIT (OUTPATIENT)
Dept: HOME HEALTH SERVICES | Facility: HOME HEALTH | Age: 85
End: 2020-10-29
Payer: MEDICARE

## 2020-10-29 ENCOUNTER — HOME CARE VISIT (OUTPATIENT)
Dept: SCHEDULING | Facility: HOME HEALTH | Age: 85
End: 2020-10-29
Payer: MEDICARE

## 2020-10-29 VITALS
DIASTOLIC BLOOD PRESSURE: 73 MMHG | OXYGEN SATURATION: 96 % | SYSTOLIC BLOOD PRESSURE: 124 MMHG | HEART RATE: 79 BPM | TEMPERATURE: 97.8 F

## 2020-10-29 PROCEDURE — 3331090001 HH PPS REVENUE CREDIT

## 2020-10-29 PROCEDURE — G0156 HHCP-SVS OF AIDE,EA 15 MIN: HCPCS

## 2020-10-29 PROCEDURE — 3331090002 HH PPS REVENUE DEBIT

## 2020-10-29 PROCEDURE — G0157 HHC PT ASSISTANT EA 15: HCPCS

## 2020-10-30 ENCOUNTER — HOME CARE VISIT (OUTPATIENT)
Dept: SCHEDULING | Facility: HOME HEALTH | Age: 85
End: 2020-10-30
Payer: MEDICARE

## 2020-10-30 ENCOUNTER — HOME CARE VISIT (OUTPATIENT)
Dept: HOME HEALTH SERVICES | Facility: HOME HEALTH | Age: 85
End: 2020-10-30
Payer: MEDICARE

## 2020-10-30 PROCEDURE — 3331090002 HH PPS REVENUE DEBIT

## 2020-10-30 PROCEDURE — G0152 HHCP-SERV OF OT,EA 15 MIN: HCPCS

## 2020-10-30 PROCEDURE — 3331090001 HH PPS REVENUE CREDIT

## 2020-10-31 PROCEDURE — 3331090002 HH PPS REVENUE DEBIT

## 2020-10-31 PROCEDURE — 3331090001 HH PPS REVENUE CREDIT

## 2020-11-01 PROCEDURE — 3331090001 HH PPS REVENUE CREDIT

## 2020-11-01 PROCEDURE — 3331090002 HH PPS REVENUE DEBIT

## 2020-11-01 NOTE — PROGRESS NOTES
The visit was missed due to the following reason(s): SN attempted to contact pt. at 8am, 1130am, and 1300 to schedule time for pt. to be seen today for lab draw and pt. not answering his telephone or returning phone call. Please advise further what you would like done.

## 2020-11-02 PROCEDURE — 3331090001 HH PPS REVENUE CREDIT

## 2020-11-02 PROCEDURE — 3331090002 HH PPS REVENUE DEBIT

## 2020-11-03 ENCOUNTER — HOME CARE VISIT (OUTPATIENT)
Dept: SCHEDULING | Facility: HOME HEALTH | Age: 85
End: 2020-11-03
Payer: MEDICARE

## 2020-11-03 VITALS — SYSTOLIC BLOOD PRESSURE: 110 MMHG | DIASTOLIC BLOOD PRESSURE: 68 MMHG

## 2020-11-03 PROCEDURE — G0156 HHCP-SVS OF AIDE,EA 15 MIN: HCPCS

## 2020-11-03 PROCEDURE — 3331090002 HH PPS REVENUE DEBIT

## 2020-11-03 PROCEDURE — 3331090001 HH PPS REVENUE CREDIT

## 2020-11-03 PROCEDURE — G0157 HHC PT ASSISTANT EA 15: HCPCS

## 2020-11-04 ENCOUNTER — HOME CARE VISIT (OUTPATIENT)
Dept: SCHEDULING | Facility: HOME HEALTH | Age: 85
End: 2020-11-04
Payer: MEDICARE

## 2020-11-04 PROCEDURE — 3331090001 HH PPS REVENUE CREDIT

## 2020-11-04 PROCEDURE — 3331090002 HH PPS REVENUE DEBIT

## 2020-11-04 PROCEDURE — G0152 HHCP-SERV OF OT,EA 15 MIN: HCPCS

## 2020-11-05 ENCOUNTER — HOME CARE VISIT (OUTPATIENT)
Dept: HOME HEALTH SERVICES | Facility: HOME HEALTH | Age: 85
End: 2020-11-05
Payer: MEDICARE

## 2020-11-05 ENCOUNTER — HOME CARE VISIT (OUTPATIENT)
Dept: SCHEDULING | Facility: HOME HEALTH | Age: 85
End: 2020-11-05
Payer: MEDICARE

## 2020-11-05 PROCEDURE — 3331090001 HH PPS REVENUE CREDIT

## 2020-11-05 PROCEDURE — G0152 HHCP-SERV OF OT,EA 15 MIN: HCPCS

## 2020-11-05 PROCEDURE — G0156 HHCP-SVS OF AIDE,EA 15 MIN: HCPCS

## 2020-11-05 PROCEDURE — 3331090002 HH PPS REVENUE DEBIT

## 2020-11-06 ENCOUNTER — HOME CARE VISIT (OUTPATIENT)
Dept: SCHEDULING | Facility: HOME HEALTH | Age: 85
End: 2020-11-06
Payer: MEDICARE

## 2020-11-06 PROCEDURE — 3331090002 HH PPS REVENUE DEBIT

## 2020-11-06 PROCEDURE — G0157 HHC PT ASSISTANT EA 15: HCPCS

## 2020-11-06 PROCEDURE — 3331090001 HH PPS REVENUE CREDIT

## 2020-11-07 PROCEDURE — 3331090002 HH PPS REVENUE DEBIT

## 2020-11-07 PROCEDURE — 3331090001 HH PPS REVENUE CREDIT

## 2020-11-08 VITALS
DIASTOLIC BLOOD PRESSURE: 72 MMHG | SYSTOLIC BLOOD PRESSURE: 112 MMHG | HEART RATE: 67 BPM | OXYGEN SATURATION: 95 % | TEMPERATURE: 96.7 F

## 2020-11-08 PROCEDURE — 3331090001 HH PPS REVENUE CREDIT

## 2020-11-08 PROCEDURE — 3331090002 HH PPS REVENUE DEBIT

## 2020-11-09 ENCOUNTER — HOME CARE VISIT (OUTPATIENT)
Dept: SCHEDULING | Facility: HOME HEALTH | Age: 85
End: 2020-11-09
Payer: MEDICARE

## 2020-11-09 PROCEDURE — 3331090001 HH PPS REVENUE CREDIT

## 2020-11-09 PROCEDURE — G0157 HHC PT ASSISTANT EA 15: HCPCS

## 2020-11-09 PROCEDURE — 3331090002 HH PPS REVENUE DEBIT

## 2020-11-10 ENCOUNTER — HOME CARE VISIT (OUTPATIENT)
Dept: SCHEDULING | Facility: HOME HEALTH | Age: 85
End: 2020-11-10
Payer: MEDICARE

## 2020-11-10 VITALS
HEART RATE: 61 BPM | OXYGEN SATURATION: 99 % | TEMPERATURE: 97.5 F | DIASTOLIC BLOOD PRESSURE: 61 MMHG | SYSTOLIC BLOOD PRESSURE: 114 MMHG

## 2020-11-10 PROCEDURE — 3331090002 HH PPS REVENUE DEBIT

## 2020-11-10 PROCEDURE — 3331090001 HH PPS REVENUE CREDIT

## 2020-11-10 PROCEDURE — G0156 HHCP-SVS OF AIDE,EA 15 MIN: HCPCS

## 2020-11-10 PROCEDURE — G0152 HHCP-SERV OF OT,EA 15 MIN: HCPCS

## 2020-11-11 ENCOUNTER — HOME CARE VISIT (OUTPATIENT)
Dept: HOME HEALTH SERVICES | Facility: HOME HEALTH | Age: 85
End: 2020-11-11
Payer: MEDICARE

## 2020-11-11 PROCEDURE — 3331090001 HH PPS REVENUE CREDIT

## 2020-11-11 PROCEDURE — 3331090002 HH PPS REVENUE DEBIT

## 2020-11-12 ENCOUNTER — HOME CARE VISIT (OUTPATIENT)
Dept: SCHEDULING | Facility: HOME HEALTH | Age: 85
End: 2020-11-12
Payer: MEDICARE

## 2020-11-12 VITALS
HEART RATE: 62 BPM | OXYGEN SATURATION: 97 % | DIASTOLIC BLOOD PRESSURE: 81 MMHG | SYSTOLIC BLOOD PRESSURE: 119 MMHG | TEMPERATURE: 97.5 F

## 2020-11-12 PROCEDURE — G0157 HHC PT ASSISTANT EA 15: HCPCS

## 2020-11-12 PROCEDURE — 3331090002 HH PPS REVENUE DEBIT

## 2020-11-12 PROCEDURE — 3331090001 HH PPS REVENUE CREDIT

## 2020-11-12 PROCEDURE — G0156 HHCP-SVS OF AIDE,EA 15 MIN: HCPCS

## 2020-11-13 ENCOUNTER — HOME CARE VISIT (OUTPATIENT)
Dept: SCHEDULING | Facility: HOME HEALTH | Age: 85
End: 2020-11-13
Payer: MEDICARE

## 2020-11-13 PROCEDURE — G0152 HHCP-SERV OF OT,EA 15 MIN: HCPCS

## 2020-11-13 PROCEDURE — 3331090001 HH PPS REVENUE CREDIT

## 2020-11-13 PROCEDURE — 3331090002 HH PPS REVENUE DEBIT

## 2020-11-14 PROCEDURE — 3331090002 HH PPS REVENUE DEBIT

## 2020-11-14 PROCEDURE — 3331090001 HH PPS REVENUE CREDIT

## 2020-11-15 PROCEDURE — 3331090001 HH PPS REVENUE CREDIT

## 2020-11-15 PROCEDURE — 3331090002 HH PPS REVENUE DEBIT

## 2020-11-16 ENCOUNTER — HOME CARE VISIT (OUTPATIENT)
Dept: HOME HEALTH SERVICES | Facility: HOME HEALTH | Age: 85
End: 2020-11-16
Payer: MEDICARE

## 2020-11-16 PROCEDURE — 3331090001 HH PPS REVENUE CREDIT

## 2020-11-16 PROCEDURE — 3331090002 HH PPS REVENUE DEBIT

## 2020-11-17 ENCOUNTER — HOME CARE VISIT (OUTPATIENT)
Dept: HOME HEALTH SERVICES | Facility: HOME HEALTH | Age: 85
End: 2020-11-17
Payer: MEDICARE

## 2020-11-17 PROCEDURE — 3331090001 HH PPS REVENUE CREDIT

## 2020-11-17 PROCEDURE — 3331090002 HH PPS REVENUE DEBIT

## 2020-11-18 ENCOUNTER — HOME CARE VISIT (OUTPATIENT)
Dept: SCHEDULING | Facility: HOME HEALTH | Age: 85
End: 2020-11-18
Payer: MEDICARE

## 2020-11-18 PROCEDURE — 3331090002 HH PPS REVENUE DEBIT

## 2020-11-18 PROCEDURE — 3331090001 HH PPS REVENUE CREDIT

## 2020-11-18 PROCEDURE — G0152 HHCP-SERV OF OT,EA 15 MIN: HCPCS

## 2020-11-19 ENCOUNTER — HOME CARE VISIT (OUTPATIENT)
Dept: SCHEDULING | Facility: HOME HEALTH | Age: 85
End: 2020-11-19
Payer: MEDICARE

## 2020-11-19 ENCOUNTER — HOME CARE VISIT (OUTPATIENT)
Dept: HOME HEALTH SERVICES | Facility: HOME HEALTH | Age: 85
End: 2020-11-19
Payer: MEDICARE

## 2020-11-19 VITALS
HEART RATE: 42 BPM | OXYGEN SATURATION: 97 % | TEMPERATURE: 97.7 F | SYSTOLIC BLOOD PRESSURE: 120 MMHG | DIASTOLIC BLOOD PRESSURE: 72 MMHG | RESPIRATION RATE: 18 BRPM

## 2020-11-19 PROCEDURE — G0152 HHCP-SERV OF OT,EA 15 MIN: HCPCS

## 2020-11-19 PROCEDURE — 3331090002 HH PPS REVENUE DEBIT

## 2020-11-19 PROCEDURE — 3331090001 HH PPS REVENUE CREDIT

## 2020-11-19 PROCEDURE — G0157 HHC PT ASSISTANT EA 15: HCPCS

## 2020-11-20 ENCOUNTER — HOME CARE VISIT (OUTPATIENT)
Dept: HOME HEALTH SERVICES | Facility: HOME HEALTH | Age: 85
End: 2020-11-20
Payer: MEDICARE

## 2020-11-20 PROCEDURE — 3331090002 HH PPS REVENUE DEBIT

## 2020-11-20 PROCEDURE — 3331090001 HH PPS REVENUE CREDIT

## 2020-11-21 PROCEDURE — 3331090002 HH PPS REVENUE DEBIT

## 2020-11-21 PROCEDURE — 3331090001 HH PPS REVENUE CREDIT

## 2020-11-21 PROCEDURE — 3331090003 HH PPS REVENUE ADJ

## 2020-11-22 PROCEDURE — 3331090002 HH PPS REVENUE DEBIT

## 2020-11-22 PROCEDURE — 3331090001 HH PPS REVENUE CREDIT

## 2020-11-23 ENCOUNTER — HOME CARE VISIT (OUTPATIENT)
Dept: HOME HEALTH SERVICES | Facility: HOME HEALTH | Age: 85
End: 2020-11-23
Payer: MEDICARE

## 2020-11-23 ENCOUNTER — HOME CARE VISIT (OUTPATIENT)
Dept: SCHEDULING | Facility: HOME HEALTH | Age: 85
End: 2020-11-23
Payer: MEDICARE

## 2020-11-23 PROCEDURE — 3331090002 HH PPS REVENUE DEBIT

## 2020-11-23 PROCEDURE — G0151 HHCP-SERV OF PT,EA 15 MIN: HCPCS

## 2020-11-23 PROCEDURE — 400014 HH F/U

## 2020-11-23 PROCEDURE — 3331090001 HH PPS REVENUE CREDIT

## 2020-11-24 ENCOUNTER — HOME CARE VISIT (OUTPATIENT)
Dept: SCHEDULING | Facility: HOME HEALTH | Age: 85
End: 2020-11-24
Payer: MEDICARE

## 2020-11-24 VITALS
HEART RATE: 75 BPM | OXYGEN SATURATION: 98 % | TEMPERATURE: 97.6 F | RESPIRATION RATE: 18 BRPM | SYSTOLIC BLOOD PRESSURE: 122 MMHG | DIASTOLIC BLOOD PRESSURE: 62 MMHG

## 2020-11-24 VITALS
SYSTOLIC BLOOD PRESSURE: 151 MMHG | TEMPERATURE: 96.9 F | RESPIRATION RATE: 24 BRPM | DIASTOLIC BLOOD PRESSURE: 73 MMHG | OXYGEN SATURATION: 94 % | HEART RATE: 66 BPM

## 2020-11-24 PROCEDURE — 3331090001 HH PPS REVENUE CREDIT

## 2020-11-24 PROCEDURE — G0152 HHCP-SERV OF OT,EA 15 MIN: HCPCS

## 2020-11-24 PROCEDURE — G0299 HHS/HOSPICE OF RN EA 15 MIN: HCPCS

## 2020-11-24 PROCEDURE — 3331090002 HH PPS REVENUE DEBIT

## 2020-11-25 ENCOUNTER — HOME CARE VISIT (OUTPATIENT)
Dept: SCHEDULING | Facility: HOME HEALTH | Age: 85
End: 2020-11-25
Payer: MEDICARE

## 2020-11-25 PROCEDURE — G0156 HHCP-SVS OF AIDE,EA 15 MIN: HCPCS

## 2020-11-25 PROCEDURE — 3331090002 HH PPS REVENUE DEBIT

## 2020-11-25 PROCEDURE — G0152 HHCP-SERV OF OT,EA 15 MIN: HCPCS

## 2020-11-25 PROCEDURE — 3331090001 HH PPS REVENUE CREDIT

## 2020-11-26 PROCEDURE — 3331090002 HH PPS REVENUE DEBIT

## 2020-11-26 PROCEDURE — 3331090001 HH PPS REVENUE CREDIT

## 2020-11-27 ENCOUNTER — HOME CARE VISIT (OUTPATIENT)
Dept: HOME HEALTH SERVICES | Facility: HOME HEALTH | Age: 85
End: 2020-11-27
Payer: MEDICARE

## 2020-11-27 PROCEDURE — 3331090002 HH PPS REVENUE DEBIT

## 2020-11-27 PROCEDURE — 3331090001 HH PPS REVENUE CREDIT

## 2020-11-28 PROCEDURE — 3331090002 HH PPS REVENUE DEBIT

## 2020-11-28 PROCEDURE — 3331090001 HH PPS REVENUE CREDIT

## 2020-11-29 PROCEDURE — 3331090002 HH PPS REVENUE DEBIT

## 2020-11-29 PROCEDURE — 3331090001 HH PPS REVENUE CREDIT

## 2020-11-30 PROCEDURE — 3331090001 HH PPS REVENUE CREDIT

## 2020-11-30 PROCEDURE — 3331090002 HH PPS REVENUE DEBIT

## 2020-12-01 ENCOUNTER — HOME CARE VISIT (OUTPATIENT)
Dept: SCHEDULING | Facility: HOME HEALTH | Age: 85
End: 2020-12-01
Payer: MEDICARE

## 2020-12-01 PROCEDURE — 3331090002 HH PPS REVENUE DEBIT

## 2020-12-01 PROCEDURE — G0156 HHCP-SVS OF AIDE,EA 15 MIN: HCPCS

## 2020-12-01 PROCEDURE — G0151 HHCP-SERV OF PT,EA 15 MIN: HCPCS

## 2020-12-01 PROCEDURE — 3331090001 HH PPS REVENUE CREDIT

## 2020-12-02 ENCOUNTER — HOME CARE VISIT (OUTPATIENT)
Dept: SCHEDULING | Facility: HOME HEALTH | Age: 85
End: 2020-12-02
Payer: MEDICARE

## 2020-12-02 VITALS
DIASTOLIC BLOOD PRESSURE: 81 MMHG | HEART RATE: 63 BPM | RESPIRATION RATE: 24 BRPM | SYSTOLIC BLOOD PRESSURE: 140 MMHG | TEMPERATURE: 97.1 F | OXYGEN SATURATION: 97 %

## 2020-12-02 PROCEDURE — 3331090002 HH PPS REVENUE DEBIT

## 2020-12-02 PROCEDURE — G0152 HHCP-SERV OF OT,EA 15 MIN: HCPCS

## 2020-12-02 PROCEDURE — 3331090001 HH PPS REVENUE CREDIT

## 2020-12-02 NOTE — PROGRESS NOTES
PATIENT RECERTIFICATION ASSESSMENT IS PLANNED FOR THIS WEEK VIA  OT SERVICES, AS [facility] IS NOT AVAILABLE THIS WEEK.

## 2020-12-03 ENCOUNTER — HOME CARE VISIT (OUTPATIENT)
Dept: SCHEDULING | Facility: HOME HEALTH | Age: 85
End: 2020-12-03
Payer: MEDICARE

## 2020-12-03 PROCEDURE — 3331090002 HH PPS REVENUE DEBIT

## 2020-12-03 PROCEDURE — 3331090001 HH PPS REVENUE CREDIT

## 2020-12-03 PROCEDURE — G0156 HHCP-SVS OF AIDE,EA 15 MIN: HCPCS

## 2020-12-04 ENCOUNTER — HOME CARE VISIT (OUTPATIENT)
Dept: SCHEDULING | Facility: HOME HEALTH | Age: 85
End: 2020-12-04
Payer: MEDICARE

## 2020-12-04 PROCEDURE — G0152 HHCP-SERV OF OT,EA 15 MIN: HCPCS

## 2020-12-04 PROCEDURE — 3331090001 HH PPS REVENUE CREDIT

## 2020-12-04 PROCEDURE — 3331090002 HH PPS REVENUE DEBIT

## 2020-12-04 PROCEDURE — G0151 HHCP-SERV OF PT,EA 15 MIN: HCPCS

## 2020-12-05 VITALS
RESPIRATION RATE: 24 BRPM | OXYGEN SATURATION: 88 % | SYSTOLIC BLOOD PRESSURE: 118 MMHG | TEMPERATURE: 97.3 F | DIASTOLIC BLOOD PRESSURE: 63 MMHG | HEART RATE: 79 BPM

## 2020-12-05 PROCEDURE — 3331090001 HH PPS REVENUE CREDIT

## 2020-12-05 PROCEDURE — 3331090002 HH PPS REVENUE DEBIT

## 2020-12-06 PROCEDURE — 3331090002 HH PPS REVENUE DEBIT

## 2020-12-06 PROCEDURE — 3331090001 HH PPS REVENUE CREDIT

## 2020-12-07 ENCOUNTER — HOME CARE VISIT (OUTPATIENT)
Dept: SCHEDULING | Facility: HOME HEALTH | Age: 85
End: 2020-12-07
Payer: MEDICARE

## 2020-12-07 PROCEDURE — 3331090002 HH PPS REVENUE DEBIT

## 2020-12-07 PROCEDURE — G0152 HHCP-SERV OF OT,EA 15 MIN: HCPCS

## 2020-12-07 PROCEDURE — 3331090001 HH PPS REVENUE CREDIT

## 2020-12-08 ENCOUNTER — HOME CARE VISIT (OUTPATIENT)
Dept: SCHEDULING | Facility: HOME HEALTH | Age: 85
End: 2020-12-08
Payer: MEDICARE

## 2020-12-08 PROCEDURE — G0151 HHCP-SERV OF PT,EA 15 MIN: HCPCS

## 2020-12-08 PROCEDURE — G0156 HHCP-SVS OF AIDE,EA 15 MIN: HCPCS

## 2020-12-08 PROCEDURE — 3331090001 HH PPS REVENUE CREDIT

## 2020-12-08 PROCEDURE — 3331090002 HH PPS REVENUE DEBIT

## 2020-12-09 VITALS
SYSTOLIC BLOOD PRESSURE: 128 MMHG | OXYGEN SATURATION: 96 % | TEMPERATURE: 97.7 F | HEART RATE: 75 BPM | DIASTOLIC BLOOD PRESSURE: 73 MMHG | RESPIRATION RATE: 18 BRPM

## 2020-12-09 PROCEDURE — 3331090001 HH PPS REVENUE CREDIT

## 2020-12-09 PROCEDURE — 3331090002 HH PPS REVENUE DEBIT

## 2020-12-10 ENCOUNTER — HOME CARE VISIT (OUTPATIENT)
Dept: SCHEDULING | Facility: HOME HEALTH | Age: 85
End: 2020-12-10
Payer: MEDICARE

## 2020-12-10 VITALS
HEART RATE: 60 BPM | DIASTOLIC BLOOD PRESSURE: 64 MMHG | SYSTOLIC BLOOD PRESSURE: 129 MMHG | TEMPERATURE: 97.4 F | OXYGEN SATURATION: 98 %

## 2020-12-10 VITALS
DIASTOLIC BLOOD PRESSURE: 74 MMHG | HEART RATE: 61 BPM | SYSTOLIC BLOOD PRESSURE: 133 MMHG | TEMPERATURE: 97.4 F | OXYGEN SATURATION: 99 %

## 2020-12-10 PROCEDURE — G0157 HHC PT ASSISTANT EA 15: HCPCS

## 2020-12-10 PROCEDURE — G0152 HHCP-SERV OF OT,EA 15 MIN: HCPCS

## 2020-12-10 PROCEDURE — 3331090001 HH PPS REVENUE CREDIT

## 2020-12-10 PROCEDURE — 3331090002 HH PPS REVENUE DEBIT

## 2020-12-11 ENCOUNTER — HOME CARE VISIT (OUTPATIENT)
Dept: SCHEDULING | Facility: HOME HEALTH | Age: 85
End: 2020-12-11
Payer: MEDICARE

## 2020-12-11 PROCEDURE — G0156 HHCP-SVS OF AIDE,EA 15 MIN: HCPCS

## 2020-12-11 PROCEDURE — 3331090002 HH PPS REVENUE DEBIT

## 2020-12-11 PROCEDURE — 3331090001 HH PPS REVENUE CREDIT

## 2020-12-12 PROCEDURE — 3331090002 HH PPS REVENUE DEBIT

## 2020-12-12 PROCEDURE — 3331090001 HH PPS REVENUE CREDIT

## 2020-12-13 PROCEDURE — 3331090002 HH PPS REVENUE DEBIT

## 2020-12-13 PROCEDURE — 3331090001 HH PPS REVENUE CREDIT

## 2020-12-14 PROCEDURE — 3331090002 HH PPS REVENUE DEBIT

## 2020-12-14 PROCEDURE — 3331090001 HH PPS REVENUE CREDIT

## 2020-12-15 ENCOUNTER — HOME CARE VISIT (OUTPATIENT)
Dept: SCHEDULING | Facility: HOME HEALTH | Age: 85
End: 2020-12-15
Payer: MEDICARE

## 2020-12-15 PROCEDURE — G0157 HHC PT ASSISTANT EA 15: HCPCS

## 2020-12-15 PROCEDURE — 3331090001 HH PPS REVENUE CREDIT

## 2020-12-15 PROCEDURE — G0156 HHCP-SVS OF AIDE,EA 15 MIN: HCPCS

## 2020-12-15 PROCEDURE — 3331090002 HH PPS REVENUE DEBIT

## 2020-12-16 ENCOUNTER — HOME CARE VISIT (OUTPATIENT)
Dept: SCHEDULING | Facility: HOME HEALTH | Age: 85
End: 2020-12-16
Payer: MEDICARE

## 2020-12-16 VITALS
DIASTOLIC BLOOD PRESSURE: 71 MMHG | OXYGEN SATURATION: 100 % | SYSTOLIC BLOOD PRESSURE: 138 MMHG | TEMPERATURE: 97.4 F | HEART RATE: 70 BPM

## 2020-12-16 PROCEDURE — 3331090001 HH PPS REVENUE CREDIT

## 2020-12-16 PROCEDURE — 3331090002 HH PPS REVENUE DEBIT

## 2020-12-17 ENCOUNTER — HOME CARE VISIT (OUTPATIENT)
Dept: SCHEDULING | Facility: HOME HEALTH | Age: 85
End: 2020-12-17
Payer: MEDICARE

## 2020-12-17 PROCEDURE — 3331090001 HH PPS REVENUE CREDIT

## 2020-12-17 PROCEDURE — G0156 HHCP-SVS OF AIDE,EA 15 MIN: HCPCS

## 2020-12-17 PROCEDURE — 3331090002 HH PPS REVENUE DEBIT

## 2020-12-18 ENCOUNTER — HOME CARE VISIT (OUTPATIENT)
Dept: SCHEDULING | Facility: HOME HEALTH | Age: 85
End: 2020-12-18
Payer: MEDICARE

## 2020-12-18 PROCEDURE — 3331090003 HH PPS REVENUE ADJ

## 2020-12-18 PROCEDURE — 3331090001 HH PPS REVENUE CREDIT

## 2020-12-18 PROCEDURE — G0151 HHCP-SERV OF PT,EA 15 MIN: HCPCS

## 2020-12-18 PROCEDURE — 3331090002 HH PPS REVENUE DEBIT

## 2020-12-19 VITALS
HEART RATE: 52 BPM | TEMPERATURE: 97.1 F | DIASTOLIC BLOOD PRESSURE: 62 MMHG | RESPIRATION RATE: 24 BRPM | SYSTOLIC BLOOD PRESSURE: 114 MMHG

## 2020-12-19 PROCEDURE — 3331090001 HH PPS REVENUE CREDIT

## 2020-12-19 PROCEDURE — 3331090002 HH PPS REVENUE DEBIT

## 2020-12-20 PROCEDURE — 3331090002 HH PPS REVENUE DEBIT

## 2020-12-20 PROCEDURE — 3331090001 HH PPS REVENUE CREDIT

## 2020-12-21 PROCEDURE — 3331090001 HH PPS REVENUE CREDIT

## 2020-12-21 PROCEDURE — 3331090002 HH PPS REVENUE DEBIT

## 2021-02-03 ENCOUNTER — OFFICE VISIT (OUTPATIENT)
Dept: CARDIOLOGY CLINIC | Age: 86
End: 2021-02-03
Payer: MEDICARE

## 2021-02-03 VITALS
OXYGEN SATURATION: 96 % | DIASTOLIC BLOOD PRESSURE: 78 MMHG | HEIGHT: 75 IN | TEMPERATURE: 97.4 F | RESPIRATION RATE: 18 BRPM | HEART RATE: 60 BPM | SYSTOLIC BLOOD PRESSURE: 110 MMHG | WEIGHT: 196 LBS | BODY MASS INDEX: 24.37 KG/M2

## 2021-02-03 DIAGNOSIS — I50.30 DIASTOLIC CONGESTIVE HEART FAILURE, UNSPECIFIED HF CHRONICITY (HCC): ICD-10-CM

## 2021-02-03 DIAGNOSIS — T46.2X5A AMIODARONE PULMONARY TOXICITY: ICD-10-CM

## 2021-02-03 DIAGNOSIS — J98.4 AMIODARONE PULMONARY TOXICITY: ICD-10-CM

## 2021-02-03 DIAGNOSIS — I35.0 AORTIC VALVE STENOSIS, UNSPECIFIED ETIOLOGY: ICD-10-CM

## 2021-02-03 DIAGNOSIS — I48.20 CHRONIC ATRIAL FIBRILLATION (HCC): Primary | ICD-10-CM

## 2021-02-03 DIAGNOSIS — I49.5 SSS (SICK SINUS SYNDROME) (HCC): ICD-10-CM

## 2021-02-03 DIAGNOSIS — I44.7 LBBB (LEFT BUNDLE BRANCH BLOCK): ICD-10-CM

## 2021-02-03 DIAGNOSIS — J70.4: ICD-10-CM

## 2021-02-03 PROCEDURE — G8427 DOCREV CUR MEDS BY ELIG CLIN: HCPCS | Performed by: INTERNAL MEDICINE

## 2021-02-03 PROCEDURE — 3288F FALL RISK ASSESSMENT DOCD: CPT | Performed by: INTERNAL MEDICINE

## 2021-02-03 PROCEDURE — 93000 ELECTROCARDIOGRAM COMPLETE: CPT | Performed by: INTERNAL MEDICINE

## 2021-02-03 PROCEDURE — G8420 CALC BMI NORM PARAMETERS: HCPCS | Performed by: INTERNAL MEDICINE

## 2021-02-03 PROCEDURE — G8536 NO DOC ELDER MAL SCRN: HCPCS | Performed by: INTERNAL MEDICINE

## 2021-02-03 PROCEDURE — 99214 OFFICE O/P EST MOD 30 MIN: CPT | Performed by: INTERNAL MEDICINE

## 2021-02-03 PROCEDURE — 1100F PTFALLS ASSESS-DOCD GE2>/YR: CPT | Performed by: INTERNAL MEDICINE

## 2021-02-03 PROCEDURE — G8510 SCR DEP NEG, NO PLAN REQD: HCPCS | Performed by: INTERNAL MEDICINE

## 2021-02-03 NOTE — PROGRESS NOTES
Verified patient with two patient identifiers. Medications reviewed/approved by Dr. Jose Dumont. Chief Complaint   Patient presents with    Irregular Heart Beat     6 month follow up    CHF    Hypertension     1. Have you been to the ER, urgent care clinic since your last visit? Hospitalized since your last visit?no    2. Have you seen or consulted any other health care providers outside of the [address] [place] [address] since your last visit? Include any pap smears or colon screening.  no

## 2021-02-03 NOTE — PROGRESS NOTES
Karel Owens is a 80 y.o. male is here for routine f/u. Stable SMITH, no other CV sx or complaints.  Had PPM check in 5/18. Chronic afib on anticoag, SSS, s/p pacemaker, ILD with pulmonary fibrosis/amiodarone toxicity, mild to mod AS. Last Echo 8/19 with mean AV gradient 25, LVEF 55-60, sev LAE, mod pulm hypertension. .   Some fatigue, sleep issues. Using home O2 at night. EP([place])  following PPM remotely The patient denies chest pain, orthopnea, PND, LE edema, palpitations, syncope, presyncope. 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    Cushingoid side effect of steroids ([address] 05/02/2014    Aortic valve stenosis     ILD (interstitial lung disease) ([address]     Orthostatic hypotension 04/24/2013    SSS (sick sinus syndrome) (HCC)     HCVD (hypertensive cardiovascular disease)     Diastolic CHF (HCC)     CRI (chronic renal insufficiency)       Derian Arrieta MD  Past Medical History:   Diagnosis Date    Aortic valve stenosis     Atrial fibrillation (HCC)     BPH (benign prostatic hyperplasia)     CRI (chronic renal insufficiency)     Diastolic CHF ([address]     ED (erectile dysfunction)     HCVD (hypertensive cardiovascular disease)     ILD (interstitial lung disease) ([address]     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     Orthostatic hypotension 4/24/2013    SSS (sick sinus syndrome) [facility])       Past Surgical History:   Procedure Laterality Date    HX PACEMAKER  2001    DDD, [employer]     Allergies   Allergen Reactions    Amiodarone Shortness of Breath     Pulmonary fibrosis Family History   Problem Relation Age of Onset    Alcohol abuse Mother     Alcohol abuse Father       Social History     Socioeconomic History    Marital status:      Spouse name: Not on file    Number of children: Not on file    Years of education: Not on file    Highest education level: Not on file   Occupational History    Not on file   Social Needs    Financial resource strain: Not on file    Food insecurity     Worry: Not on file     Inability: Not on file    Transportation needs     Medical: Not on file     Non-medical: Not on file   Tobacco Use    Smoking status: Former Smoker     Types: Pipe     Quit date: 1983     Years since quittin.8    Smokeless tobacco: Never Used   Substance and Sexual Activity    Alcohol use: Yes     Comment: occasionally    Drug use: No    Sexual activity: Not Currently   Lifestyle    Physical activity     Days per week: Not on file     Minutes per session: Not on file    Stress: Not on file   Relationships    Social connections     Talks on phone: Not on file     Gets together: Not on file     Attends Tenriism service: Not on file     Active member of club or organization: Not on file     Attends meetings of clubs or organizations: Not on file     Relationship status: Not on file    Intimate partner violence     Fear of current or ex partner: Not on file     Emotionally abused: Not on file     Physically abused: Not on file     Forced sexual activity: Not on file   Other Topics Concern    Not on file   Social History Narrative    Not on file      Current Outpatient Medications   Medication Sig    OXYGEN-AIR DELIVERY SYSTEMS 2 L/min by Nasal route as needed for Other. OXYGEN-AIR DELIVERY, 2L/MIN, VIA NASAL CANNULA, PRN/UPON EXERTION, MONITOR STATS.  sertraline (ZOLOFT) 25 mg tablet Take 1 Tab by mouth daily. Indications: nerves    lisinopriL (PRINIVIL, ZESTRIL) 20 mg tablet Take 1 Tab by mouth two (2) times a day.  Indications: pressure and heart    finasteride (PROSCAR) 5 mg tablet TAKE 1 TABLET EVERY DAY (Patient taking differently: Take 5 mg by mouth daily. TAKE 1 TABLET EVERY DAY)    furosemide (LASIX) 20 mg tablet TAKE 1 TABLET BY MOUTH DAILY    apixaban (ELIQUIS) 5 mg tablet Take 1 Tab by mouth two (2) times a day.  metoprolol tartrate (LOPRESSOR) 50 mg tablet Take 1 Tab by mouth two (2) times a day.  levothyroxine (SYNTHROID) 75 mcg tablet Take 75 mcg by mouth Daily (before breakfast).  MULTIVITAMIN PO Take 1 Each by mouth daily.  predniSONE (DELTASONE) 5 mg tablet Take 1 Tab by mouth daily. Indications: pulmonary fibrosis (Patient taking differently: Take 5 mg by mouth daily. Patient takes 6 mg q day. Indications: pulmonary fibrosis)    predniSONE (DELTASONE) 1 mg tablet Take 1 Tab by mouth daily (with breakfast). Indications: pulmonary fibrosis (Patient taking differently: Take 6 mg by mouth daily (with breakfast). Indications: pulmonary fibrosis)    traZODone (DESYREL) 50 mg tablet Take 50 mg by mouth as needed for Sleep. No current facility-administered medications for this visit. Review of Symptoms:    CONST  No weight change. No fever, chills, sweats    ENT No visual changes, URI sx, sore throat    CV  See HPI   RESP  No cough, or sputum, wheezing. Also see HPI   GI  No abdominal pain or change in bowel habits. No heartburn or dysphagia. No melena or rectal bleeding.   No dysuria, urgency, frequency, hematuria   MSKEL  No joint pain, swelling. No muscle pain. SKIN  No rash or lesions. NEURO  No headache, syncope, or seizure. No weakness, loss of sensation, or paresthesias. PSYCH  No low mood or depression  No anxiety. HE/LYMPH  No easy bruising, abnormal bleeding, or enlarged glands.         Physical ExamPhysical Exam:    Visit Vitals  /78 (BP 1 Location: Left upper arm, BP Patient Position: Sitting, BP Cuff Size: Adult)   Pulse 60   Temp 97.4 °F (36.3 °C) (Temporal)   Resp 18 Ht 6' 3\" (1.905 m)   Wt 196 lb (88.9 kg)   SpO2 96% Comment: ra   BMI 24.50 kg/m²     Gen: NAD in WC on home O2  HEENT:  PERRL, throat clear  Neck: no adenopathy, no thyromegaly, no JVD   Heart:  Regular,Nl S1S2,  II/VI murmur, gallop or rub. Lungs:  Faint crackles  Abdomen:   Soft, non-tender, bowel sounds are active.    Extremities:  No edema  Pulse: symmetric  Neuro: A&O times 3, No focal neuro deficits    Cardiographics    ECG: underlying afib, LBBB w/ repol changes    Labs:   Lab Results   Component Value Date/Time    Sodium 143 11/24/2020 01:45 PM    Sodium 145 (H) 06/24/2020 10:59 AM    Sodium 145 (H) 08/10/2015 12:56 PM    Sodium 144 06/04/2015 02:18 PM    Sodium 144 05/02/2014 11:40 AM    Potassium 4.5 11/24/2020 01:45 PM    Potassium 4.9 06/24/2020 10:59 AM    Potassium 4.7 08/10/2015 12:56 PM    Potassium 4.5 06/04/2015 02:18 PM    Potassium 4.7 05/02/2014 11:40 AM    Chloride 105 11/24/2020 01:45 PM    Chloride 103 06/24/2020 10:59 AM    Chloride 101 08/10/2015 12:56 PM    Chloride 102 06/04/2015 02:18 PM    Chloride 105 05/02/2014 11:40 AM    CO2 34 (H) 11/24/2020 01:45 PM    CO2 29 06/24/2020 10:59 AM    CO2 28 08/10/2015 12:56 PM    CO2 29 06/04/2015 02:18 PM    CO2 29 (H) 05/02/2014 11:40 AM    Anion gap 4 (L) 11/24/2020 01:45 PM    Glucose 127 (H) 11/24/2020 01:45 PM    Glucose 81 06/24/2020 10:59 AM    Glucose 94 08/10/2015 12:56 PM    Glucose 106 (H) 06/04/2015 02:18 PM    Glucose 98 05/02/2014 11:40 AM    BUN 27 (H) 11/24/2020 01:45 PM    BUN 26 06/24/2020 10:59 AM    BUN 25 08/10/2015 12:56 PM    BUN 21 06/04/2015 02:18 PM    BUN 24 05/02/2014 11:40 AM    Creatinine 1.27 11/24/2020 01:45 PM    Creatinine 1.37 (H) 06/24/2020 10:59 AM    Creatinine 1.26 08/10/2015 12:56 PM    Creatinine 1.24 06/04/2015 02:18 PM    Creatinine 1.36 (H) 05/02/2014 11:40 AM    BUN/Creatinine ratio 21 (H) 11/24/2020 01:45 PM    BUN/Creatinine ratio 19 06/24/2020 10:59 AM    BUN/Creatinine ratio 20 08/10/2015 12:56 PM    BUN/Creatinine ratio 17 06/04/2015 02:18 PM    BUN/Creatinine ratio 18 05/02/2014 11:40 AM    GFR est AA >60 11/24/2020 01:45 PM    GFR est AA 54 (L) 06/24/2020 10:59 AM    GFR est AA 61 08/10/2015 12:56 PM    GFR est AA 63 06/04/2015 02:18 PM    GFR est AA 56 (L) 05/02/2014 11:40 AM    GFR est non-AA 54 (L) 11/24/2020 01:45 PM    GFR est non-AA 46 (L) 06/24/2020 10:59 AM    GFR est non-AA 53 (L) 08/10/2015 12:56 PM    GFR est non-AA 54 (L) 06/04/2015 02:18 PM    GFR est non-AA 49 (L) 05/02/2014 11:40 AM    Calcium 8.6 11/24/2020 01:45 PM    Calcium 9.0 06/24/2020 10:59 AM    Calcium 9.2 08/10/2015 12:56 PM    Calcium 9.3 06/04/2015 02:18 PM    Calcium 8.7 05/02/2014 11:40 AM    Bilirubin, total 0.6 06/04/2015 02:18 PM    Bilirubin, total 0.4 05/02/2014 11:40 AM    Alk. phosphatase 54 06/04/2015 02:18 PM    Alk. phosphatase 50 05/02/2014 11:40 AM    Protein, total 6.3 06/04/2015 02:18 PM    Protein, total 6.4 05/02/2014 11:40 AM    Albumin 3.6 06/04/2015 02:18 PM    Albumin 3.7 05/02/2014 11:40 AM    A-G Ratio 1.3 06/04/2015 02:18 PM    A-G Ratio 1.4 05/02/2014 11:40 AM    ALT (SGPT) 14 06/04/2015 02:18 PM    ALT (SGPT) 16 05/02/2014 11:40 AM     No results found for: CPK, CPKX, CPX  No results found for: CHOL, CHOLX, CHLST, CHOLV, 471661, HDL, HDLP, LDL, LDLC, DLDLP, TGLX, TRIGL, TRIGP, CHHD, CHHDX  No results found for this or any previous visit.     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  Had PPM check in 5/18. Chronic afib on anticoag, SSS, s/p pacemaker, ILD with pulmonary fibrosis/amiodarone toxicity, mild to mod AS. Last Echo 8/19 with mean AV gradient 25, LVEF 55-60, sev LAE, mod pulm hypertension. .   Some fatigue, sleep issues. Using home O2 at night. EP([place]  following PPM remotely     Plan:     Overall stable/compensated from CV standpoint. Fu with PCP and Pulmonary  Echo/doppler--call w/ results  EP PPM checks remotely--will reestablish with Dr Addie Kern as no longer in [place] in winter  Lipids and labs followed by PCP. Continue current care and f/u in 6 months.     Lavinia Ramsey MD

## 2021-02-10 ENCOUNTER — IMMUNIZATION (OUTPATIENT)
Dept: PEDIATRICS CLINIC | Age: 86
End: 2021-02-10
Payer: MEDICARE

## 2021-02-10 DIAGNOSIS — Z23 ENCOUNTER FOR IMMUNIZATION: Primary | ICD-10-CM

## 2021-02-10 PROCEDURE — 91301 COVID-19, MRNA, LNP-S, PF, 100MCG/0.5ML DOSE(MODERNA): CPT | Performed by: FAMILY MEDICINE

## 2021-02-10 PROCEDURE — 0011A COVID-19, MRNA, LNP-S, PF, 100MCG/0.5ML DOSE(MODERNA): CPT | Performed by: FAMILY MEDICINE

## 2021-02-17 ENCOUNTER — TELEPHONE (OUTPATIENT)
Dept: CARDIOLOGY CLINIC | Age: 86
End: 2021-02-17

## 2021-02-17 NOTE — TELEPHONE ENCOUNTER
----- Message from Bright Osborn LPN sent at 7/25/6140  9:28 AM EST -----  Regarding: FW: echo    ----- Message -----  From: Mindy Hawkins MD  Sent: 2/17/2021   8:13 AM EST  To: Bright Osborn LPN  Subject: echo                                             Call/advise mildly reduced LV pumping function, otherwise no changes from previous Echo--no concerns and is on appropriate meds. RTC 6 mos.    Thanks [facility]

## 2021-02-19 ENCOUNTER — TELEPHONE (OUTPATIENT)
Dept: CARDIOLOGY CLINIC | Age: 86
End: 2021-02-19

## 2021-03-03 ENCOUNTER — TELEPHONE (OUTPATIENT)
Dept: FAMILY MEDICINE CLINIC | Age: 86
End: 2021-03-03

## 2021-03-03 ENCOUNTER — TRANSCRIBE ORDER (OUTPATIENT)
Dept: INTERNAL MEDICINE CLINIC | Age: 86
End: 2021-03-03

## 2021-03-03 NOTE — TELEPHONE ENCOUNTER
----- Message from Lizbet Broussard LPN sent at 3/94/8668  9:28 AM EST -----  Regarding: FW: echo    ----- Message -----  From: Bridget Hdz MD  Sent: 2/17/2021   8:13 AM EST  To: Lizbet Broussard LPN  Subject: echo                                             Call/advise mildly reduced LV pumping function, otherwise no changes from previous Echo--no concerns and is on appropriate meds. RTC 6 mos.    Thanks [facility]

## 2021-03-03 NOTE — TELEPHONE ENCOUNTER
Call back to [facility] verbal lab order given and nurse evaluation given for patient. She will fax over paperwork for Dr. Carol Tucker to sign.

## 2021-03-03 NOTE — TELEPHONE ENCOUNTER
----- Message from Meredith Jeff sent at 3/3/2021  9:37 AM EST -----  Regarding: Manolo Benitez MD  Patient return call    Caller's first and last name and relationship (if not the patient): [facility] contact numbers: [phone]       Whose call is being returned: Brendon Ascencio LPN      Details to clarify the request: Kari Garcia is requesting follow up on lab orders request sent 03/02/2021-was not able to properly verify pt with caller       Meredith Jeff

## 2021-03-10 ENCOUNTER — VIRTUAL VISIT (OUTPATIENT)
Dept: FAMILY MEDICINE CLINIC | Age: 86
End: 2021-03-10
Payer: MEDICARE

## 2021-03-10 DIAGNOSIS — I11.0 HYPERTENSIVE HEART DISEASE WITH CHRONIC DIASTOLIC CONGESTIVE HEART FAILURE (HCC): ICD-10-CM

## 2021-03-10 DIAGNOSIS — J84.9 ILD (INTERSTITIAL LUNG DISEASE) (HCC): ICD-10-CM

## 2021-03-10 DIAGNOSIS — R54 AGE-RELATED PHYSICAL DEBILITY: ICD-10-CM

## 2021-03-10 DIAGNOSIS — I48.20 CHRONIC ATRIAL FIBRILLATION (HCC): Primary | ICD-10-CM

## 2021-03-10 DIAGNOSIS — I50.32 HYPERTENSIVE HEART DISEASE WITH CHRONIC DIASTOLIC CONGESTIVE HEART FAILURE (HCC): ICD-10-CM

## 2021-03-10 DIAGNOSIS — M15.9 PRIMARY OSTEOARTHRITIS INVOLVING MULTIPLE JOINTS: ICD-10-CM

## 2021-03-10 DIAGNOSIS — I10 ESSENTIAL HYPERTENSION: ICD-10-CM

## 2021-03-10 DIAGNOSIS — Z79.01 CHRONIC ANTICOAGULATION: ICD-10-CM

## 2021-03-10 DIAGNOSIS — R53.1 WEAKNESS GENERALIZED: ICD-10-CM

## 2021-03-10 PROCEDURE — G8427 DOCREV CUR MEDS BY ELIG CLIN: HCPCS | Performed by: FAMILY MEDICINE

## 2021-03-10 PROCEDURE — 99214 OFFICE O/P EST MOD 30 MIN: CPT | Performed by: FAMILY MEDICINE

## 2021-03-10 PROCEDURE — 3288F FALL RISK ASSESSMENT DOCD: CPT | Performed by: FAMILY MEDICINE

## 2021-03-10 PROCEDURE — G8510 SCR DEP NEG, NO PLAN REQD: HCPCS | Performed by: FAMILY MEDICINE

## 2021-03-10 PROCEDURE — 1100F PTFALLS ASSESS-DOCD GE2>/YR: CPT | Performed by: FAMILY MEDICINE

## 2021-03-10 RX ORDER — LISINOPRIL 20 MG/1
20 TABLET ORAL DAILY
Qty: 180 TAB | Refills: 0
Start: 2021-03-10

## 2021-03-10 NOTE — PROGRESS NOTES
Chief Complaint   Patient presents with   [facility] PT Review - Virtual     1. Have you been to the ER, urgent care clinic since your last visit? Hospitalized since your last visit? No    2. Have you seen or consulted any other health care providers outside of the [address] [place] [address] Eduardo since your last visit? Include any pap smears or colon screening. No     Kaz Rosario is a 80 y.o. male who was seen by synchronous (real-time) audio/video technology on 10/21/2020. Pt was seen at home. Provider was at the office. No other participants in this encounter. Subjective:     HPI:  Has moved to [facility]. Needs New Face to Face since transferring to [facility] up there. Arthritis  PT reports his arthritis has been doing well lately with phys therapy with HH. Still feeling unstable lately, had another fall last week or so. No major injury. Taking APAP 500mg, 1 tab, 2-3 doses/day, PRN pain. Pulmonary fibrosis/Amio toxicity  Current control: fair. More breathless lately. Current level: mild intermittent  Current symptoms: mild cough, mild decreased exercise tolerance, mild dyspnea  Current controller: prednisone 10mg qd  Last flareup: 3/2020. Number of flareups in past year: 1, did well with prednisone burst. Hasn't needed abx in >1y  Current symptom relief med: none  Is on O2 2LPM with exercise, as Sats dropped to 88% on RA, improved to 91% with 2LPM. At rest, Sats at 97% RA. Atrial fibrillation. Current symptoms: none  Current control agent: B-blocker  Current anticoagulant: eliquis 5mg bid  History of bleeding problems: none    Hypothyroid  Doing well on synthroid 75mcg. Lab Results   Component Value Date/Time    TSH 2.740 06/24/2020 10:59 AM     Hypertension. Blood pressures have been a little too low lately, running low 100's/40's. Has been losing weight lately. Management at last visit included continuing current regimen .  Current regimen: ACEI, beta-blocker, and loop diuretic. Symptoms include unsteadiness. Patient denies chest pain, palpitations, peripheral edema. Lab review: had labs with [facility] last week, see scans. Lab Results   Component Value Date/Time    Sodium 143 11/24/2020 01:45 PM    Potassium 4.5 11/24/2020 01:45 PM    Chloride 105 11/24/2020 01:45 PM    CO2 34 (H) 11/24/2020 01:45 PM    Anion gap 4 (L) 11/24/2020 01:45 PM    Glucose 127 (H) 11/24/2020 01:45 PM    BUN 27 (H) 11/24/2020 01:45 PM    Creatinine 1.27 11/24/2020 01:45 PM    BUN/Creatinine ratio 21 (H) 11/24/2020 01:45 PM    GFR est AA >60 11/24/2020 01:45 PM    GFR est non-AA 54 (L) 11/24/2020 01:45 PM    Calcium 8.6 11/24/2020 01:45 PM     Lab Results   Component Value Date/Time    Sodium 145 (H) 06/24/2020 10:59 AM    Potassium 4.9 06/24/2020 10:59 AM    Chloride 103 06/24/2020 10:59 AM    CO2 29 06/24/2020 10:59 AM    Glucose 81 06/24/2020 10:59 AM    BUN 26 06/24/2020 10:59 AM    Creatinine 1.37 (H) 06/24/2020 10:59 AM    BUN/Creatinine ratio 19 06/24/2020 10:59 AM    GFR est AA 54 (L) 06/24/2020 10:59 AM    GFR est non-AA 46 (L) 06/24/2020 10:59 AM    Calcium 9.0 06/24/2020 10:59 AM     BPH  Has been doing well on proscar 5mg/d. Lab Results   Component Value Date/Time    Prostate Specific Ag 1.6 06/24/2020 10:59 AM     PMH, SH, Medications/Allergies: reviewed, on chart. Current Outpatient Medications   Medication Sig    OXYGEN-AIR DELIVERY SYSTEMS 2 L/min by Nasal route as needed for Other. OXYGEN-AIR DELIVERY, 2L/MIN, VIA NASAL CANNULA, PRN/UPON EXERTION, MONITOR STATS.  sertraline (ZOLOFT) 25 mg tablet Take 1 Tab by mouth daily. Indications: nerves    lisinopriL (PRINIVIL, ZESTRIL) 20 mg tablet Take 1 Tab by mouth two (2) times a day. Indications: pressure and heart    finasteride (PROSCAR) 5 mg tablet TAKE 1 TABLET EVERY DAY (Patient taking differently: Take 5 mg by mouth daily.  TAKE 1 TABLET EVERY DAY)    furosemide (LASIX) 20 mg tablet TAKE 1 TABLET BY MOUTH DAILY    apixaban (ELIQUIS) 5 mg tablet Take 1 Tab by mouth two (2) times a day.  metoprolol tartrate (LOPRESSOR) 50 mg tablet Take 1 Tab by mouth two (2) times a day.  levothyroxine (SYNTHROID) 75 mcg tablet Take 75 mcg by mouth Daily (before breakfast).  MULTIVITAMIN PO Take  by mouth daily.  predniSONE (DELTASONE) 5 mg tablet Take 1 Tab by mouth daily. Indications: pulmonary fibrosis (Patient taking differently: Take 5 mg by mouth daily. Patient takes 6 mg q day. Indications: pulmonary fibrosis)    predniSONE (DELTASONE) 1 mg tablet Take 1 Tab by mouth daily (with breakfast). Indications: pulmonary fibrosis (Patient taking differently: Take 6 mg by mouth daily (with breakfast). Indications: pulmonary fibrosis)    traZODone (DESYREL) 50 mg tablet Take 50 mg by mouth as needed for Sleep. No current facility-administered medications for this visit. Allergies   Allergen Reactions    Amiodarone Shortness of Breath     Pulmonary fibrosis     ROS:  Constitutional: No fever, chills or abnormal weight loss  Respiratory: No cough, mild SOB, stable  CV: No chest pain or Palpitations    VS review:  Home Temp 97.3. Home pulse 98\  Home wt 194#  Wt Readings from Last 3 Encounters:   07/29/20 201 lb (91.2 kg)   08/20/19 210 lb 12.8 oz (95.6 kg)   07/31/19 214 lb (97.1 kg)     Home BP: 115/43  BP Readings from Last 3 Encounters:   10/20/20 (!) 138/49   10/14/20 123/78   10/12/20 112/68     Objective:     General: alert, cooperative, no distress   Mental  status: mental status: alert, oriented to person, place, and time, normal mood, behavior, speech, dress, motor activity, and thought processes   Resp: resp: normal effort and no respiratory distress   Neuro: neuro: no gross deficits           Assessment & Plan:     Weakness and DJD  PT is homebound and is working with [facility]. Con't to work with [facility] to get him set up for home PT for strengthening and gait improvement. This constitutes a face-to-face evaluation.  He is homebound and cannot drive. Will order hospital bed to aid in ADLs such as transfers, positioning for eating and sleeping. PF  Doing well on chronic immunosuppression with prednisone. Prev seeing pulm Dr. Daryl Levin in [place], but now in [place], so considering moving to Dr. Taryn Lee in in [facility]. Next visit with them due summertime. Afib/ac  Doing well. Cont' current tx. Primary mgmt with [facility] Payne pt considering transition to local Cardio DR. [facility], due for recheck summertime. HTN  overcontrolled. Cut lisinopril to 20mg 1 daily. Had good labs in 2/2021. Plan recheck late fall, but follow BP checks. F/U 6mo/PRN    Symptom review: Covid 19    Time-based coding, delete if not needed: I spent at least 15 minutes with this established patient, and >50% of the time was spent counseling and/or coordinating care regarding care plan and expected course  Henrique Lopez MD    Due to this being a TeleHealth evaluation, many elements of the physical examination are unable to be assessed. We discussed the expected course, resolution and complications of the diagnosis(es) in detail. Medication risks, benefits, costs, interactions, and alternatives were discussed as indicated. I advised him to contact the office if his condition worsens, changes or fails to improve as anticipated. He expressed understanding with the diagnosis(es) and plan. Pursuant to the emergency declaration under the [facility], [facility] waiver authority and the [employer] General Act, this Virtual  Visit was conducted, with patient's consent, to reduce the patient's risk of exposure to COVID-19 and provide continuity of care for an established patient. Services were provided through audio-video synchronous discussion virtually to substitute for in-person clinic visit.     Consent:  He and/or his healthcare decision maker is aware that this patient-initiated Telehealth encounter is a billable service, with coverage as determined by his insurance carrier. He is aware that he may receive a bill and has provided verbal consent to proceed. CPT Codes 20028-12736 for Established Patients may apply to this Telehealth Visit    1. Have you been to the ER, urgent care clinic since your last visit? Hospitalized since your last visit? No    2. Have you seen or consulted any other health care providers outside of the [address] [place] [address] since your last visit? Include any pap smears or colon screening. No    Identified pt with two pt identifiers(name and ). Reviewed record in preparation for visit and have obtained necessary documentation.     Symptom review:  NO  Fever   NO  Shaking chills  NO  Cough  NO Headaches  NO  Body aches  NO  Coughing up blood  NO  Chest congestion  NO  Chest pain  NO  Shortness of breath  NO  Profound Loss of smell/taste  NO  Nausea/Vomiting   NO  Loose stool/Diarrhea  NO  any skin issues    Patient Risk Factors Reviewed as follows:    NO  have you or any one in your home have had or has a pending covid test  NO  have you been in Close contact with confirmed COVID19 patient   NO  History of recent travel to affected geographical areas within the past 14 days  yes  COPD  NO  Active Cancer/Leukemia/Lymphoma/Chemotherapy  YES Oral steroid use  NO  Pregnant  NO  Diabetes Mellitus  YES  Heart disease  NO  Asthma  YES Health care worker at home  NO Health care worker  NO Is there a Pregnant Woman in the home  NO Dialysis pt in the home   NO a large number of people living in the home

## 2021-03-12 ENCOUNTER — TELEPHONE (OUTPATIENT)
Dept: FAMILY MEDICINE CLINIC | Age: 86
End: 2021-03-12

## 2021-03-12 NOTE — TELEPHONE ENCOUNTER
----- Message from Tory Ko sent at 3/12/2021 11:25 AM EST -----  Regarding: Dr Kerrie Zhou Message/Vendor Calls    Caller's first and last name:Columba with Home health      Reason for call:Pt weight is 186. The heart rate yesterday was 50, however the caregiver has seen it higher at 160 level. Oxygen saturation varies as low as in the 80's. Caregiver mentioned seeing some mild confusion and forgetfulness.       Callback required yes/no and why:yes      Best contact number(s):[phone]      Details to clarify the request:      Tory Ko

## 2021-03-12 NOTE — TELEPHONE ENCOUNTER
Columba from [address] [place] [address] Catracho Connelly states this is information request by PCP.    She would like to remind PCP that patients now live in [place],

## 2021-03-15 ENCOUNTER — TELEPHONE (OUTPATIENT)
Dept: FAMILY MEDICINE CLINIC | Age: 86
End: 2021-03-15

## 2021-03-15 DIAGNOSIS — J84.9 ILD (INTERSTITIAL LUNG DISEASE) (HCC): Primary | ICD-10-CM

## 2021-03-15 PROBLEM — R54 AGE-RELATED PHYSICAL DEBILITY: Status: ACTIVE | Noted: 2021-03-15

## 2021-03-15 PROBLEM — R53.1 WEAKNESS GENERALIZED: Status: ACTIVE | Noted: 2021-03-15

## 2021-03-15 RX ORDER — ALBUTEROL SULFATE 90 UG/1
2 AEROSOL, METERED RESPIRATORY (INHALATION)
Qty: 1 INHALER | Refills: 5 | Status: SHIPPED | OUTPATIENT
Start: 2021-03-15

## 2021-03-15 NOTE — PROGRESS NOTES
Contacted by [facility]. PT having decline in function, worsening shortness of breath, irritated eyes, post-nasal drip. No fevers/chills, but did feel better with APAP. /68. Try adding flonase or nasacort OTC 1pf/naris/day, add albuterol 1pf TID prn dyspnea. SaO2 95-97% on 2LPM (about the same). To urgent care/ ER if worsening.

## 2021-03-15 NOTE — TELEPHONE ENCOUNTER
Received request from [facility] Barbie requesting that new orders be sent for a hospital bed. She states the reasoning is due to oxygen increase requirements and decreased mobility. Orders and notes can be faxed to [phone] Attn Barbie.  All questions can be directed to Barbie at [phone]

## 2021-03-22 ENCOUNTER — TELEPHONE (OUTPATIENT)
Dept: FAMILY MEDICINE CLINIC | Age: 86
End: 2021-03-22

## 2021-03-22 NOTE — TELEPHONE ENCOUNTER
----- Message from Sky Haley sent at 3/19/2021  4:54 PM EDT -----  Regarding: Dr Jethro Mariscal  General Message/Vendor Calls    Caller's first and last name: Barbie with [facility]      Reason for call: Eric Apple would like to get the status on the request called in on Mon 3/15 regarding pts Rx for a hospital bed      Callback required yes/no and why: yes, to get the status on the request called in on Mon 3/15 regarding pts Rx for a hospital bed      Best contact number(s): [phone]      Details to clarify the request: Eric Apple would like to get the status on the request called in on Mon 3/15 regarding pts Rx for a hospital bed      Sky [place]

## 2021-03-22 NOTE — TELEPHONE ENCOUNTER
----- Message from Marco A Garcia sent at 3/22/2021 10:27 AM EDT -----  Regarding: /Telephone    General Message/Vendor Calls    Caller's first and last name: Krys Downey      Reason for call: Faxing record release request      Callback required yes/no and why: No      Best contact number(s): [phone]      Details to clarify the request: Krys Downey with [facility] in [place], [place] is sending a release form for medical records and xray reports from last 4 months for the pt. Pt is visiting son in the area and became ill while there. Pt will see Dr. Jocy Velaqsuez regarding a pulmonary issue pt is having.         Marco A Garcia

## 2021-03-23 ENCOUNTER — TELEPHONE (OUTPATIENT)
Dept: FAMILY MEDICINE CLINIC | Age: 86
End: 2021-03-23

## 2021-03-23 DIAGNOSIS — J84.9 ILD (INTERSTITIAL LUNG DISEASE) (HCC): Primary | ICD-10-CM

## 2021-03-23 DIAGNOSIS — M15.9 PRIMARY OSTEOARTHRITIS INVOLVING MULTIPLE JOINTS: ICD-10-CM

## 2021-03-23 DIAGNOSIS — R53.1 WEAKNESS GENERALIZED: ICD-10-CM

## 2021-03-23 NOTE — TELEPHONE ENCOUNTER
----- Message from Catracho Esparza sent at 3/23/2021 11:34 AM EDT -----  Regarding: Dr Gita Oneal  Patient return call    Caller's first and last name and relationship (if not the patient):  Barbie with [employer] contact number(s): [phone]      Whose call is being returned: Claudia Marie      Details to clarify the request: Claudia Marie is returning a phone call regarding pt      Catracho Esparza

## 2021-03-23 NOTE — TELEPHONE ENCOUNTER
----- Message from Emely Sol sent at 3/23/2021 11:39 AM EDT -----  Regarding: Dr. Eric Copeland  General Message/Vendor Calls    Caller's first and last name: [facility] Nurse       Reason for call: To seek approval for occupational therapy to evaluate pt.  To request more medication until pt can see his new PCP      Callback required yes/no and why: yes, to discuss      Best contact number(s): [phone]      Details to clarify the request: n/a      Emely Sol

## 2021-03-23 NOTE — LETTER
3/24/2021 8:37 AM 
 
Mr. Franny Brooke [address] [id number] To Whom It May Concern: This letter is a letter of medical need concerning hospital bed ordered for Franny Brooke, : 1934, followed at  [facility]. Due to medical problems of interstitial lung disease, arthritis, this patient has weakness and need for an electric hospital bed for help in ADL's such as transferring, positioning in bed. Please contact the office if you need further information or have any questions. Sincerely, Court Martel MD

## 2021-03-23 NOTE — TELEPHONE ENCOUNTER
Renae advised ok for OT. She will send paperwork for Dr. Lisa Holland to fill. Should pt be taking trazadone and zoloft? If so will you fill prescriptions?

## 2021-03-24 ENCOUNTER — TELEPHONE (OUTPATIENT)
Dept: FAMILY MEDICINE CLINIC | Age: 86
End: 2021-03-24

## 2021-06-30 ENCOUNTER — TELEPHONE (OUTPATIENT)
Dept: CARDIOLOGY CLINIC | Age: 86
End: 2021-06-30

## 2021-06-30 NOTE — TELEPHONE ENCOUNTER
April states back in 2009, a doctor at our practice gave this patient a \"Metronic Pacemaker. \" April requesting a release and she has already sent over the release information. April is requesting a call back with this information. PHONE: [phone]    I believe she is with Dr. Joseph Whitaker.

## 2021-07-01 NOTE — TELEPHONE ENCOUNTER
Verified patient with two types of identifiers. Received a call from April. Notified we released patient from our clinic in 2017 to Dr. Alphonso Alpers. Dr. Nick Whitten contact information given to April.